# Patient Record
Sex: FEMALE | Race: WHITE | Employment: FULL TIME | URBAN - METROPOLITAN AREA
[De-identification: names, ages, dates, MRNs, and addresses within clinical notes are randomized per-mention and may not be internally consistent; named-entity substitution may affect disease eponyms.]

---

## 2017-01-23 ENCOUNTER — TRANSFERRED RECORDS (OUTPATIENT)
Dept: HEALTH INFORMATION MANAGEMENT | Facility: CLINIC | Age: 27
End: 2017-01-23

## 2017-02-14 ENCOUNTER — TRANSFERRED RECORDS (OUTPATIENT)
Dept: HEALTH INFORMATION MANAGEMENT | Facility: CLINIC | Age: 27
End: 2017-02-14

## 2017-08-03 ENCOUNTER — APPOINTMENT (OUTPATIENT)
Dept: ULTRASOUND IMAGING | Facility: CLINIC | Age: 27
End: 2017-08-03
Attending: EMERGENCY MEDICINE
Payer: COMMERCIAL

## 2017-08-03 ENCOUNTER — HOSPITAL ENCOUNTER (EMERGENCY)
Facility: CLINIC | Age: 27
Discharge: HOME | End: 2017-08-03
Attending: EMERGENCY MEDICINE | Admitting: EMERGENCY MEDICINE
Payer: COMMERCIAL

## 2017-08-03 ENCOUNTER — APPOINTMENT (OUTPATIENT)
Dept: CT IMAGING | Facility: CLINIC | Age: 27
End: 2017-08-03
Attending: EMERGENCY MEDICINE
Payer: COMMERCIAL

## 2017-08-03 VITALS
SYSTOLIC BLOOD PRESSURE: 129 MMHG | RESPIRATION RATE: 16 BRPM | BODY MASS INDEX: 36.65 KG/M2 | TEMPERATURE: 98 F | HEART RATE: 106 BPM | DIASTOLIC BLOOD PRESSURE: 84 MMHG | HEIGHT: 65 IN | WEIGHT: 220 LBS | OXYGEN SATURATION: 98 %

## 2017-08-03 DIAGNOSIS — N39.0 URINARY TRACT INFECTION, SITE UNSPECIFIED: ICD-10-CM

## 2017-08-03 DIAGNOSIS — R10.31 ABDOMINAL PAIN, RIGHT LOWER QUADRANT: ICD-10-CM

## 2017-08-03 LAB
ALBUMIN SERPL-MCNC: 4.1 G/DL (ref 3.4–5)
ALBUMIN UR-MCNC: NEGATIVE MG/DL
ALP SERPL-CCNC: 103 U/L (ref 40–150)
ALT SERPL W P-5'-P-CCNC: 22 U/L (ref 0–50)
ANION GAP SERPL CALCULATED.3IONS-SCNC: 6 MMOL/L (ref 3–14)
APPEARANCE UR: CLEAR
AST SERPL W P-5'-P-CCNC: 14 U/L (ref 0–45)
BASOPHILS # BLD AUTO: 0 10E9/L (ref 0–0.2)
BASOPHILS NFR BLD AUTO: 0.2 %
BILIRUB SERPL-MCNC: 0.3 MG/DL (ref 0.2–1.3)
BILIRUB UR QL STRIP: NEGATIVE
BUN SERPL-MCNC: 10 MG/DL (ref 7–30)
CALCIUM SERPL-MCNC: 9.2 MG/DL (ref 8.5–10.1)
CHLORIDE SERPL-SCNC: 109 MMOL/L (ref 94–109)
CO2 SERPL-SCNC: 26 MMOL/L (ref 20–32)
COLOR UR AUTO: ABNORMAL
CREAT SERPL-MCNC: 0.69 MG/DL (ref 0.52–1.04)
DIFFERENTIAL METHOD BLD: NORMAL
EOSINOPHIL # BLD AUTO: 0.1 10E9/L (ref 0–0.7)
EOSINOPHIL NFR BLD AUTO: 1.2 %
ERYTHROCYTE [DISTWIDTH] IN BLOOD BY AUTOMATED COUNT: 13.3 % (ref 10–15)
GFR SERPL CREATININE-BSD FRML MDRD: NORMAL ML/MIN/1.7M2
GLUCOSE SERPL-MCNC: 93 MG/DL (ref 70–99)
GLUCOSE UR STRIP-MCNC: NEGATIVE MG/DL
HCG SERPL QL: NEGATIVE
HCT VFR BLD AUTO: 38.9 % (ref 35–47)
HGB BLD-MCNC: 13 G/DL (ref 11.7–15.7)
HGB UR QL STRIP: ABNORMAL
IMM GRANULOCYTES # BLD: 0 10E9/L (ref 0–0.4)
IMM GRANULOCYTES NFR BLD: 0.2 %
KETONES UR STRIP-MCNC: NEGATIVE MG/DL
LEUKOCYTE ESTERASE UR QL STRIP: ABNORMAL
LIPASE SERPL-CCNC: 121 U/L (ref 73–393)
LYMPHOCYTES # BLD AUTO: 1.9 10E9/L (ref 0.8–5.3)
LYMPHOCYTES NFR BLD AUTO: 31.9 %
MCH RBC QN AUTO: 28.2 PG (ref 26.5–33)
MCHC RBC AUTO-ENTMCNC: 33.4 G/DL (ref 31.5–36.5)
MCV RBC AUTO: 84 FL (ref 78–100)
MICRO REPORT STATUS: NORMAL
MONOCYTES # BLD AUTO: 0.3 10E9/L (ref 0–1.3)
MONOCYTES NFR BLD AUTO: 5.2 %
NEUTROPHILS # BLD AUTO: 3.6 10E9/L (ref 1.6–8.3)
NEUTROPHILS NFR BLD AUTO: 61.3 %
NITRATE UR QL: NEGATIVE
NRBC # BLD AUTO: 0 10*3/UL
NRBC BLD AUTO-RTO: 0 /100
PH UR STRIP: 6 PH (ref 5–7)
PLATELET # BLD AUTO: 286 10E9/L (ref 150–450)
POTASSIUM SERPL-SCNC: 3.8 MMOL/L (ref 3.4–5.3)
PROT SERPL-MCNC: 8 G/DL (ref 6.8–8.8)
RBC # BLD AUTO: 4.61 10E12/L (ref 3.8–5.2)
RBC #/AREA URNS AUTO: 1 /HPF (ref 0–2)
SODIUM SERPL-SCNC: 141 MMOL/L (ref 133–144)
SP GR UR STRIP: 1 (ref 1–1.03)
SPECIMEN SOURCE: NORMAL
SQUAMOUS #/AREA URNS AUTO: 2 /HPF (ref 0–1)
URN SPEC COLLECT METH UR: ABNORMAL
UROBILINOGEN UR STRIP-MCNC: NORMAL MG/DL (ref 0–2)
WBC # BLD AUTO: 5.8 10E9/L (ref 4–11)
WBC #/AREA URNS AUTO: 4 /HPF (ref 0–2)
WET PREP SPEC: NORMAL

## 2017-08-03 PROCEDURE — 25000128 H RX IP 250 OP 636: Performed by: EMERGENCY MEDICINE

## 2017-08-03 PROCEDURE — 83690 ASSAY OF LIPASE: CPT | Performed by: EMERGENCY MEDICINE

## 2017-08-03 PROCEDURE — 81001 URINALYSIS AUTO W/SCOPE: CPT | Performed by: EMERGENCY MEDICINE

## 2017-08-03 PROCEDURE — 93976 VASCULAR STUDY: CPT | Mod: XS

## 2017-08-03 PROCEDURE — 87491 CHLMYD TRACH DNA AMP PROBE: CPT | Performed by: EMERGENCY MEDICINE

## 2017-08-03 PROCEDURE — 87210 SMEAR WET MOUNT SALINE/INK: CPT | Performed by: EMERGENCY MEDICINE

## 2017-08-03 PROCEDURE — 80053 COMPREHEN METABOLIC PANEL: CPT | Performed by: EMERGENCY MEDICINE

## 2017-08-03 PROCEDURE — 25000125 ZZHC RX 250: Performed by: EMERGENCY MEDICINE

## 2017-08-03 PROCEDURE — 87086 URINE CULTURE/COLONY COUNT: CPT | Performed by: EMERGENCY MEDICINE

## 2017-08-03 PROCEDURE — 74177 CT ABD & PELVIS W/CONTRAST: CPT

## 2017-08-03 PROCEDURE — 96375 TX/PRO/DX INJ NEW DRUG ADDON: CPT

## 2017-08-03 PROCEDURE — 85025 COMPLETE CBC W/AUTO DIFF WBC: CPT | Performed by: EMERGENCY MEDICINE

## 2017-08-03 PROCEDURE — 96374 THER/PROPH/DIAG INJ IV PUSH: CPT

## 2017-08-03 PROCEDURE — 84703 CHORIONIC GONADOTROPIN ASSAY: CPT | Performed by: EMERGENCY MEDICINE

## 2017-08-03 PROCEDURE — 87591 N.GONORRHOEAE DNA AMP PROB: CPT | Performed by: EMERGENCY MEDICINE

## 2017-08-03 PROCEDURE — 76705 ECHO EXAM OF ABDOMEN: CPT | Mod: XS

## 2017-08-03 PROCEDURE — 96376 TX/PRO/DX INJ SAME DRUG ADON: CPT

## 2017-08-03 PROCEDURE — 99285 EMERGENCY DEPT VISIT HI MDM: CPT | Mod: 25

## 2017-08-03 RX ORDER — NITROFURANTOIN 25; 75 MG/1; MG/1
100 CAPSULE ORAL 2 TIMES DAILY
Qty: 14 CAPSULE | Refills: 0 | Status: SHIPPED | OUTPATIENT
Start: 2017-08-03

## 2017-08-03 RX ORDER — HYDROMORPHONE HYDROCHLORIDE 1 MG/ML
0.5 INJECTION, SOLUTION INTRAMUSCULAR; INTRAVENOUS; SUBCUTANEOUS
Status: COMPLETED | OUTPATIENT
Start: 2017-08-03 | End: 2017-08-03

## 2017-08-03 RX ORDER — IOPAMIDOL 755 MG/ML
111 INJECTION, SOLUTION INTRAVASCULAR ONCE
Status: COMPLETED | OUTPATIENT
Start: 2017-08-03 | End: 2017-08-03

## 2017-08-03 RX ORDER — ONDANSETRON 2 MG/ML
4 INJECTION INTRAMUSCULAR; INTRAVENOUS EVERY 30 MIN PRN
Status: DISCONTINUED | OUTPATIENT
Start: 2017-08-03 | End: 2017-08-03 | Stop reason: HOSPADM

## 2017-08-03 RX ADMIN — HYDROMORPHONE HYDROCHLORIDE 0.5 MG: 1 INJECTION, SOLUTION INTRAMUSCULAR; INTRAVENOUS; SUBCUTANEOUS at 14:35

## 2017-08-03 RX ADMIN — IOPAMIDOL 111 ML: 755 INJECTION, SOLUTION INTRAVENOUS at 14:45

## 2017-08-03 RX ADMIN — ONDANSETRON 4 MG: 2 SOLUTION INTRAMUSCULAR; INTRAVENOUS at 14:09

## 2017-08-03 RX ADMIN — HYDROMORPHONE HYDROCHLORIDE 0.5 MG: 1 INJECTION, SOLUTION INTRAMUSCULAR; INTRAVENOUS; SUBCUTANEOUS at 12:28

## 2017-08-03 RX ADMIN — SODIUM CHLORIDE 74 ML: 9 INJECTION, SOLUTION INTRAVENOUS at 14:46

## 2017-08-03 RX ADMIN — SODIUM CHLORIDE 1000 ML: 9 INJECTION, SOLUTION INTRAVENOUS at 12:29

## 2017-08-03 RX ADMIN — ONDANSETRON 4 MG: 2 SOLUTION INTRAMUSCULAR; INTRAVENOUS at 12:26

## 2017-08-03 ASSESSMENT — ENCOUNTER SYMPTOMS
FEVER: 0
CONSTIPATION: 0
DYSURIA: 0
DIARRHEA: 0
NAUSEA: 1
HEMATURIA: 0
BACK PAIN: 1
ABDOMINAL PAIN: 1
VOMITING: 1
CHILLS: 0

## 2017-08-03 NOTE — PROGRESS NOTES
In brief, 19 year female with right upper and right lower quadrant pain.  She has received several pain medications and several workups at outside hospitals.  She had ciprofloxacin for UTI.  She has not had any other fevers or acute changes.  Ultrasound of the right upper quadrant, pelvis and labs are obtained and unremarkable.  Patient signed out need CT with IV contrast.  It was negative.  Patient was discharged home.  She is discharged with a different antibiotics for urinary infection.  She agrees with this plan.  Also given follow-up for GI.

## 2017-08-03 NOTE — ED PROVIDER NOTES
History     Chief Complaint:  Abdominal Pain    HPI   Fanny Sultana is a 27 year old female who presents to the ED for evaluation of abdominal pain. The patient reports she has had abdominal pain for about a week. The patient had a clinic visit at [facility] on 7/28/2017 where she was prescribed Ciprofloxacin for UTI. The patient also had imaging performed on 7/31/2017 without any outstanding findings. The patient notes the medication is not helping, and the pain has been constant, prompting the patient to visit the ED. The patient notes the abdominal pain is localized on the lower right quadrant. The pain as well radiates to her back. The patient has taken Ibuprofen every 6 hours with Tylenol in between without alleviation of pain. The patient also reports symptoms of nausea and vomiting. The patient denies any fever chills, constipation, diarrhea, dysuria, hematuria, or vaginal discharge.    CT ABDOMEN PELVIS STONE PROTOCOL W/O CONTRAST, 07/31/2017  IMPRESSION:    No urinary tract stones, hydronephrosis, or other cause for flank pain.     Normal appendix.     Allergies:  No known drug allergies    Medications:    DULOXETINE HCL PO   ALPRAZOLAM PO   naproxen (NAPROSYN) 375 MG tablet   meloxicam (MOBIC) 15 MG tablet   sucralfate (CARAFATE) 1 GM tablet   omeprazole (PRILOSEC) 40 MG capsule   tiZANidine (ZANAFLEX) 4 MG capsule   MULTIPLE VITAMIN PO   rizatriptan (MAXALT) 10 MG tablet   aspirin-acetaminophen-caffeine (EXCEDRIN MIGRAINE) 250-250-65 MG per tablet   norelgestromin-ethinyl estradiol (ORTHO EVRA) 150-20 MCG/24HR patch     Past Medical History:    CRP  Anxiety  Dermoid  Migraines    Past Surgical History:    Ipsy left ovarian cystectomy  Bilateral subtenon's/periocular steroid of eyes x4    Family History:    Osteoporosis  Hypertension    Social History:  Smoking status: Never smoker  Alcohol use: Rare  Presents to ED alone  Marital Status:  Single [1]     Review of Systems   Constitutional: Negative  "for chills and fever.   Gastrointestinal: Positive for abdominal pain, nausea and vomiting. Negative for constipation and diarrhea.   Genitourinary: Negative for dysuria, hematuria and vaginal discharge.   Musculoskeletal: Positive for back pain.   All other systems reviewed and are negative.    Physical Exam     Patient Vitals for the past 24 hrs:   BP Temp Temp src Pulse Resp SpO2 Height Weight   08/03/17 1144 138/82 98  F (36.7  C) Oral 106 16 98 % 1.651 m (5' 5\") 99.8 kg (220 lb)       Physical Exam  General: Patient is alert and uncomfortable appearing.  HEENT: Head atraumatic    Eyes: pupils equal and reactive. Conjunctiva clear   Nares: patent   Oropharynx: no lesions, uvula midline, no palatal draping, normal voice, no trismus  Neck: Supple without lymphadenopathy, no meningismus  Chest: Heart regular rate and rhythm.   Lungs: Equal clear to auscultation with no wheeze or rales  Abdomen: Soft, significant RUQ and RLQ tenderness to palpation, voluntary guarding in RLQ, + murphys sign, nondistended, normal bowel sounds  Back: No costovertebral angle tenderness, no midline C, T or L spine tenderness  Neuro: Grossly nonfocal, normal speech, strength equal bilaterally, CN 2-12 intact  Extremities: No deformities, equal radial and DP pulses. No clubbing, cyanosis.  No edema  Skin: Warm and dry with no rash.   Pelvic exam: right adenexal tenderness to palpation, no discharge, no CMT      Emergency Department Course     Imaging:  Radiographic findings were communicated with the patient who voiced understanding of the findings.    US Abdomen Limited  IMPRESSION:  Normal right upper quadrant ultrasound. No gallstones or  bile duct dilatation. As read by Radiology.    US Pelvic Complete w Transvaginal & Abd/Pel Duplex Limited  IMPRESSION: Normal exam. As read by Radiology.    CT Abdomen Pelvis w Contrast  IMPRESSION: Unremarkable CT of the abdomen and pelvis. No cause for  abdominal pain demonstrated. As read by " Radiology.    Laboratory:  UA: Specific gravity 1.002(L), Blood trace, Leukocyte esterase large, WBC 4(H), Squamous epithelial 2(H)  HCG: Negative  Lipase: 121  Wet prep: Negative  CBC: o/w WNL (WBC 5.8, HGB 13.0, )   CMP: o/w WNL (Creatinine 0.69)    Urine culture aerobic bacterial: In process  Neisseria gonorrhoeae PCR: In process  Chlamydia trachomatis PCR: In process    Interventions:  1226: Zofran 4mg IV  1228: Dilaudid 0.5mg IV  1229: NS 1L Bolus IV  1409: Zofran 4mg IV  1435:  Dilaudid 0.5mg IV     Emergency Department Course:  Past medical records, nursing notes, and vitals reviewed.  1152: I performed an exam of the patient and obtained history, as documented above.  IV inserted and blood drawn.    The patient was sent for an abdomen/pelvis CT and abdomen/pelvis ultra-sound while in the emergency department, findings above.    1228: I rechecked the patient. Explained findings to patient.    1401: I rechecked the patient. Findings and plan explained to the Patient. Patient discharged home with instructions regarding supportive care, medications, and reasons to return. The importance of close follow-up was reviewed.     Impression & Plan      Medical Decision Making:  Fanny Sultana is a 27 year old female who presents with right lower quadrant pain, persistent over the last week. Had work-up at two other ERs. Negative CT, non-contrast, for stones, but was placed on Cipro for UTI. She has continued pain, nausea, and vomiting, but no fevers. Here, her initial pain was mostly in the right upper quadrant. Ultrasound is negative. Her CBC, CMP, and lipase are all within normal limits. Urine analysis continues to have large leukocyte esterase and 4 white blood cells per high power field. It is possible that there is a pyelonephritis component to this. Her kidney looked normal, and there is no sign of hydronephrosis, making stone unlikely. Pelvic exam did show some tenderness over the right adenexa. She went  for ultrasound of the pelvis and showed normal right adenexa with good normal blood flow. I do not believe this is consistent with acute ovarian torsion. Wet prep is negative. CBC is normal. I discussed with patient changing her antibiotics to see if that improves things. She is just complaining of continued pain. She does have a strong narcotic use history and gets 60 Percocet every two weeks from her primary care provider, and has had a couple of Norco prescriptions in the last week as well. There may be a component drug seeking behavior. She has had two small doses of Dilaudid here for pain.     She will go for repeat CT this time with IV contrast to ensure that there is no developing appendicitis, abscess, colitis, or diverticulitis or other surgical emergency. If the CT of her abdomen pelvis is negative, the plan is for her to go home. I am going to change her to Macrobid for UTI and possible resistance to cipro, and have her follow-up with GI. This may very well be Crohn's disease or other inflammatory bowel disease. Patient understands that if the CT is negative, we have effectively ruled out acute surgical emergencies in her abdomen, and she will need further work-up and testing as an out-patient. Patient will be signed out to my partner to follow-up with the CT results. If abnormal, make changes to disposition; otherwise, plan will be for the patient to be discharged with follow-up with GI. Macrobid for UTI and primary care provider follow-up as well. Patient expressed agreement and understanding of this plan     Diagnosis:    ICD-10-CM   1. Abdominal pain, right lower quadrant R10.31   2. Urinary tract infection, site unspecified N39.0     Disposition: Patient discharged to home    Discharge Medications:  New Prescriptions    NITROFURANTOIN, MACROCRYSTAL-MONOHYDRATE, (MACROBID) 100 MG CAPSULE    Take 1 capsule (100 mg) by mouth 2 times daily     Shannan Howe  8/3/2017    EMERGENCY DEPARTMENT    I,  Shannan Howe, am serving as a scribe at 11:52 AM on 8/3/2017 to document services personally performed by Pao Cueva MD based on my observations and the provider's statements to me.          Pao Cueva MD  08/03/17 2008

## 2017-08-03 NOTE — ED AVS SNAPSHOT
Emergency Department    [address] [place] [id number]    Phone:  [phone]    Fax:  [phone]                                       Fanny Sultana   MRN: [id number]    Department:   Emergency Department   Date of Visit:  8/3/2017           After Visit Summary Signature Page     I have received my discharge instructions, and my questions have been answered. I have discussed any challenges I see with this plan with the nurse or doctor.    ..........................................................................................................................................  Patient/Patient Representative Signature      ..........................................................................................................................................  Patient Representative Print Name and Relationship to Patient    ..................................................               ................................................  Date                                            Time    ..........................................................................................................................................  Reviewed by Signature/Title    ...................................................              ..............................................  Date                                                            Time

## 2017-08-03 NOTE — DISCHARGE INSTRUCTIONS
*Abdominal Pain, Unknown Cause (Female)    The exact cause of your abdominal (stomach) pain is not certain. This does not mean that this is something to worry about, or the right tests were not done. Everyone likes to know the exact cause of the problem, but sometimes with abdominal pain, there is no clear-cut cause, and this could be a good thing. The good news is that your symptoms can be treated, and you will feel better.   Your condition does not seem serious now; however, sometimes the signs of a serious problem may take more time to appear. For this reason, it is important for you to watch for any new symptoms, problems, or worsening of your condition.  Over the next few days, the abdominal pain may come and go, or be continuous. Other common symptoms can include nausea and vomiting. Sometimes it can be difficult to tell if you feel nauseous, you may just feel bad and not associate that feeling with nausea. Constipation, diarrhea, and a fever may go along with the pain.  The pain may continue even if treated correctly over the following days. Depending on how things go, sometimes the cause can become clear and may require further or different treatment. Additional evaluations, medications, or tests may be needed.  Home care  Your health care provider may prescribe medications for pain, symptoms, or an infection.  Follow the health care provider's instructions for taking these medications.  General care    Rest until your next exam. No strenuous activities.    Try to find positions that ease discomfort. A small pillow placed on the abdomen may help relieve pain.    Something warm on your abdomen (such as a heating pad) may help, but be careful not to burn yourself.  Diet    Do not force yourself to eat, especially if having cramps, vomiting, or diarrhea.    Water is important so you do not get dehydrated. Soup may also be good. Sports drinks may also help, especially if they are not too acidic. Make sure you  don't drink sugary drinks as this can make things worse. Take liquids in small amounts. Do not guzzle them.    Caffeine sometimes makes the pain and cramping worse.    Avoid dairy products if you have vomiting or diarrhea.    Don't eat large amounts at a time. Wait a few minutes between bites.    Eat a diet low in fiber (called a low-residue diet). Foods allowed include refined breads, white rice, fruit and vegetable juices without pulp, tender meats. These foods will pass more easily through the intestine.    Avoid fried or fatty foods, dairy, alcohol and spicy foods until your symptoms go away.  Follow-up care  Follow up with your health care provider as instructed, or if your pain does not begin to improve in the next 24 hours.  When to seek medical care  Seek prompt medical care if any of the following occur:    Pain gets worse or moves to the right lower abdomen    New or worsening vomiting or diarrhea    Swelling of the abdomen    Unable to pass stool for more than three days    New fever over 101  F (38.3 C), or rising fever    Blood in vomit or bowel movements (dark red or black color)    Jaundice (yellow color of eyes and skin)    Weakness, dizziness    Chest, arm, back, neck or jaw pain    Unexpected vaginal bleeding or missed period  Call 911  Call emergency services if any of the following occur:    Trouble breathing    Confusion    Fainting or loss of consciousness    Rapid heart rate    Seizure    [id number] [facility], [address] [place] [address], [place] [address]. All rights reserved. This information is not intended as a substitute for professional medical care. Always follow your healthcare professional's instructions.

## 2017-08-03 NOTE — ED AVS SNAPSHOT
Emergency Department    [address] [facility] [address]    Phone:  [phone]    Fax:  [phone]                                       Fanny Sultana   MRN: [id number]    Department:   Emergency Department   Date of Visit:  8/3/2017           Patient Information     Date Of Birth          1990        Your diagnoses for this visit were:     Abdominal pain, right lower quadrant     Urinary tract infection, site unspecified        You were seen by Pao Cueva MD and Mayelin Castaneda MD.      Follow-up Information     Follow up with [facility]    Contact information:    [address] [facility] [address]          Schedule an appointment as soon as possible for a visit with [facility].    Contact information:    [address] [address]  [facility] [phone]        Follow up with  Emergency Department.    Specialty:  EMERGENCY MEDICINE    Why:  If symptoms worsen    Contact information:    [address] [facility] [address] [phone]        Discharge Instructions         *Abdominal Pain, Unknown Cause (Female)    The exact cause of your abdominal (stomach) pain is not certain. This does not mean that this is something to worry about, or the right tests were not done. Everyone likes to know the exact cause of the problem, but sometimes with abdominal pain, there is no clear-cut cause, and this could be a good thing. The good news is that your symptoms can be treated, and you will feel better.   Your condition does not seem serious now; however, sometimes the signs of a serious problem may take more time to appear. For this reason, it is important for you to watch for any new symptoms, problems, or worsening of your condition.  Over the next few days, the abdominal pain may come and go, or be continuous. Other common symptoms can include nausea and vomiting. Sometimes it can be difficult to tell if you  feel nauseous, you may just feel bad and not associate that feeling with nausea. Constipation, diarrhea, and a fever may go along with the pain.  The pain may continue even if treated correctly over the following days. Depending on how things go, sometimes the cause can become clear and may require further or different treatment. Additional evaluations, medications, or tests may be needed.  Home care  Your health care provider may prescribe medications for pain, symptoms, or an infection.  Follow the health care provider's instructions for taking these medications.  General care    Rest until your next exam. No strenuous activities.    Try to find positions that ease discomfort. A small pillow placed on the abdomen may help relieve pain.    Something warm on your abdomen (such as a heating pad) may help, but be careful not to burn yourself.  Diet    Do not force yourself to eat, especially if having cramps, vomiting, or diarrhea.    Water is important so you do not get dehydrated. Soup may also be good. Sports drinks may also help, especially if they are not too acidic. Make sure you don't drink sugary drinks as this can make things worse. Take liquids in small amounts. Do not guzzle them.    Caffeine sometimes makes the pain and cramping worse.    Avoid dairy products if you have vomiting or diarrhea.    Don't eat large amounts at a time. Wait a few minutes between bites.    Eat a diet low in fiber (called a low-residue diet). Foods allowed include refined breads, white rice, fruit and vegetable juices without pulp, tender meats. These foods will pass more easily through the intestine.    Avoid fried or fatty foods, dairy, alcohol and spicy foods until your symptoms go away.  Follow-up care  Follow up with your health care provider as instructed, or if your pain does not begin to improve in the next 24 hours.  When to seek medical care  Seek prompt medical care if any of the following occur:    Pain gets worse or  moves to the right lower abdomen    New or worsening vomiting or diarrhea    Swelling of the abdomen    Unable to pass stool for more than three days    New fever over 101  F (38.3 C), or rising fever    Blood in vomit or bowel movements (dark red or black color)    Jaundice (yellow color of eyes and skin)    Weakness, dizziness    Chest, arm, back, neck or jaw pain    Unexpected vaginal bleeding or missed period  Call 911  Call emergency services if any of the following occur:    Trouble breathing    Confusion    Fainting or loss of consciousness    Rapid heart rate    Seizure    [id number] [employer] [facility], [address] [place] [address], [place] [address]. All rights reserved. This information is not intended as a substitute for professional medical care. Always follow your healthcare professional's instructions.          24 Hour Appointment Hotline       To make an appointment at any [facility], call [phone] ([phone]). If you don't have a family doctor or clinic, we will help you find one. [facility] clinics are conveniently located to serve the needs of you and your family.             Review of your medicines      START taking        Dose / Directions Last dose taken    nitroFURantoin (macrocrystal-monohydrate) 100 MG capsule   Commonly known as:  MACROBID   Dose:  100 mg   Quantity:  14 capsule        Take 1 capsule (100 mg) by mouth 2 times daily   Refills:  0          Our records show that you are taking the medicines listed below. If these are incorrect, please call your family doctor or clinic.        Dose / Directions Last dose taken    ALPRAZOLAM PO        Refills:  0        CIPROFLOXACIN PO        Refills:  0        DULOXETINE HCL PO        Refills:  0        EXCEDRIN MIGRAINE 250-250-65 MG per tablet   Dose:  1 tablet   Generic drug:  aspirin-acetaminophen-caffeine        Take 1 tablet by mouth every 6 hours as needed.   Refills:  0        MAXALT 10 MG tablet   Dose:  10 mg   Generic drug:   rizatriptan        Take 10 mg by mouth at onset of headache.   Refills:  0        meloxicam 15 MG tablet   Commonly known as:  MOBIC   Dose:  15 mg   Quantity:  90 tablet        Take 1 tablet (15 mg) by mouth daily   Refills:  1        MULTIPLE VITAMIN PO   Dose:  1 tablet        Take 1 tablet by mouth daily.   Refills:  0        naproxen 375 MG tablet   Commonly known as:  NAPROSYN   Dose:  375 mg   Quantity:  60 tablet        Take 1 tablet (375 mg) by mouth 2 times daily (with meals)   Refills:  11        omeprazole 40 MG capsule   Commonly known as:  priLOSEC   Dose:  40 mg   Quantity:  30 capsule        Take 1 capsule (40 mg) by mouth daily Take 30-60 minutes before a meal.   Refills:  1        ORTHO EVRA 150-35 MCG/24HR patch   Dose:  1 patch   Generic drug:  norelgestromin-ethinyl estradiol        Place 1 patch onto the skin once a week.   Refills:  0        sucralfate 1 GM tablet   Commonly known as:  CARAFATE   Dose:  1 g   Quantity:  120 tablet        Take 1 tablet (1 g) by mouth 4 times daily   Refills:  5        tiZANidine 4 MG capsule   Commonly known as:  ZANAFLEX        Half tablet -one tablet daily as needed   Refills:  0                Prescriptions were sent or printed at these locations (1 Prescription)                   Other Prescriptions                Printed at Department/Unit printer (1 of 1)         nitroFURantoin, macrocrystal-monohydrate, (MACROBID) 100 MG capsule                Procedures and tests performed during your visit     CBC with platelets differential    CT Abdomen Pelvis w Contrast    Chlamydia trachomatis PCR    Comprehensive metabolic panel    HCG qualitative    Lipase    Neisseria gonorrhoeae PCR    UA reflex to Microscopic and Culture    US Abdomen Limited    US Pelvic Complete w Transvaginal & Abd/Pel Duplex Limited    Urine Culture Aerobic Bacterial    Wet prep      Orders Needing Specimen Collection     None      Pending Results     Date and Time Order Name Status  Description    8/3/2017 1232 Chlamydia trachomatis PCR In process     8/3/2017 1232 Neisseria gonorrhoeae PCR In process     8/3/2017 1201 Urine Culture Aerobic Bacterial In process             Pending Culture Results     Date and Time Order Name Status Description    8/3/2017 1232 Chlamydia trachomatis PCR In process     8/3/2017 1232 Neisseria gonorrhoeae PCR In process     8/3/2017 1201 Urine Culture Aerobic Bacterial In process             Pending Results Instructions     If you had any lab results that were not finalized at the time of your Discharge, you can call the ED Lab Result RN at [phone]. You will be contacted by this team for any positive Lab results or changes in treatment. The nurses are available 7 days a week from 10A to 6:30P.  You can leave a message 24 hours per day and they will return your call.        Test Results From Your Hospital Stay        8/3/2017 12:22 PM      Component Results     Component Value Ref Range & Units Status    WBC 5.8 4.0 - 11.0 10e9/L Final    RBC Count 4.61 3.8 - 5.2 10e12/L Final    Hemoglobin 13.0 11.7 - 15.7 g/dL Final    Hematocrit 38.9 35.0 - 47.0 % Final    MCV 84 78 - 100 fl Final    MCH 28.2 26.5 - 33.0 pg Final    MCHC 33.4 31.5 - 36.5 g/dL Final    RDW 13.3 10.0 - 15.0 % Final    Platelet Count 286 150 - 450 10e9/L Final    Diff Method Automated Method  Final    % Neutrophils 61.3 % Final    % Lymphocytes 31.9 % Final    % Monocytes 5.2 % Final    % Eosinophils 1.2 % Final    % Basophils 0.2 % Final    % Immature Granulocytes 0.2 % Final    Nucleated RBCs 0 0 /100 Final    Absolute Neutrophil 3.6 1.6 - 8.3 10e9/L Final    Absolute Lymphocytes 1.9 0.8 - 5.3 10e9/L Final    Absolute Monocytes 0.3 0.0 - 1.3 10e9/L Final    Absolute Eosinophils 0.1 0.0 - 0.7 10e9/L Final    Absolute Basophils 0.0 0.0 - 0.2 10e9/L Final    Abs Immature Granulocytes 0.0 0 - 0.4 10e9/L Final    Absolute Nucleated RBC 0.0  Final         8/3/2017 12:39 PM      Component Results      Component Value Ref Range & Units Status    Sodium 141 133 - 144 mmol/L Final    Potassium 3.8 3.4 - 5.3 mmol/L Final    Chloride 109 94 - 109 mmol/L Final    Carbon Dioxide 26 20 - 32 mmol/L Final    Anion Gap 6 3 - 14 mmol/L Final    Glucose 93 70 - 99 mg/dL Final    Urea Nitrogen 10 7 - 30 mg/dL Final    Creatinine 0.69 0.52 - 1.04 mg/dL Final    GFR Estimate >90  Non  GFR Calc   >60 mL/min/1.7m2 Final    GFR Estimate If Black >90   GFR Calc   >60 mL/min/1.7m2 Final    Calcium 9.2 8.5 - 10.1 mg/dL Final    Bilirubin Total 0.3 0.2 - 1.3 mg/dL Final    Albumin 4.1 3.4 - 5.0 g/dL Final    Protein Total 8.0 6.8 - 8.8 g/dL Final    Alkaline Phosphatase 103 40 - 150 U/L Final    ALT 22 0 - 50 U/L Final    AST 14 0 - 45 U/L Final         8/3/2017 12:36 PM      Component Results     Component Value Ref Range & Units Status    Lipase 121 73 - 393 U/L Final         8/3/2017 12:38 PM      Component Results     Component Value Ref Range & Units Status    HCG Qualitative Serum Negative NEG Final         8/3/2017  1:10 PM      Narrative     ULTRASOUND ABDOMEN LIMITED  8/3/2017 12:53 PM     HISTORY: Right upper quadrant pain.    FINDINGS:  Liver is normal in echogenicity without focal lesions. The  gallbladder is normal without stones or sludge. Common bile duct is  normal in diameter. Pancreas is normal where visualized. Examination  of the right kidney is unremarkable.        Impression     IMPRESSION:  Normal right upper quadrant ultrasound. No gallstones or  bile duct dilatation.    PALOMO VARGAS MD         8/3/2017 12:43 PM      Component Results     Component    Specimen Description    Cervix    Wet Prep    No Trichomonas seen  No yeast seen  No clue cells seen  Few PMNs seen      Micro Report Status    FINAL 08/03/2017         8/3/2017 12:36 PM         8/3/2017 12:36 PM         8/3/2017  3:27 PM      Narrative     ULTRASOUND PELVIS COMPLETE WITH TRANSVAGINAL AND DOPPLER LIMITED   8/3/2017  2:15 PM    HISTORY: 27-year-old woman with right lower quadrant pain for the  previous week.    COMPARISON: None.    TECHNIQUE: Transabdominal ultrasound images obtained throughout the  pelvis. Due to inadequate bladder fullness, transvaginal ultrasound  imaging required. Grayscale and color Doppler imaging obtained with  color Doppler imaging of both ovaries.    FINDINGS: The uterus is 6.3 x 3.2 x 2.5 cm. Endometrial thickness is  0.4 cm.    The right ovary is 2.5 x 2 x 2.2 cm and the left ovary is 2.3 x 1.5 x  1.5 cm.  Arterial and venous blood flow noted in both ovaries. No  pelvic free fluid. Follicles visible in both ovaries.        Impression     IMPRESSION: Normal exam.    DARIEL BYRNES MD         8/3/2017  2:13 PM      Component Results     Component Value Ref Range & Units Status    Color Urine Straw  Final    Appearance Urine Clear  Final    Glucose Urine Negative NEG mg/dL Final    Bilirubin Urine Negative NEG Final    Ketones Urine Negative NEG mg/dL Final    Specific Gravity Urine 1.002 (L) 1.003 - 1.035 Final    Blood Urine Trace (A) NEG Final    pH Urine 6.0 5.0 - 7.0 pH Final    Protein Albumin Urine Negative NEG mg/dL Final    Urobilinogen mg/dL Normal 0.0 - 2.0 mg/dL Final    Nitrite Urine Negative NEG Final    Leukocyte Esterase Urine Large (A) NEG Final    Source Midstream Urine  Final    RBC Urine 1 0 - 2 /HPF Final    WBC Urine 4 (H) 0 - 2 /HPF Final    Squamous Epithelial /HPF Urine 2 (H) 0 - 1 /HPF Final         8/3/2017  2:15 PM         8/3/2017  3:09 PM      Narrative     CT ABDOMEN AND PELVIS WITH CONTRAST   8/3/2017 2:47 PM     HISTORY: Right lower quadrant abdominal pain.    TECHNIQUE:  111 mL Isovue-370. CT images of the abdomen and pelvis  following nonionic intravenous contrast. Radiation dose for this scan  was reduced using automated exposure control, adjustment of the mA  and/or kV according to patient size, or iterative reconstruction  technique.    COMPARISON:  None.    FINDINGS: The liver, gallbladder, spleen, pancreas, adrenal glands,  and kidneys appear normal. A splenule is noted anterior to the spleen.  No free air, bowel obstruction, or free fluid. No CT evidence of  appendicitis. No abdominal or retroperitoneal adenopathy. No pelvic  mass, lymphadenopathy, or free fluid.        Impression     IMPRESSION: Unremarkable CT of the abdomen and pelvis. No cause for  abdominal pain demonstrated.    TIARA DUGGAN MD                Clinical Quality Measure: Blood Pressure Screening     Your blood pressure was checked while you were in the emergency department today. The last reading we obtained was  BP: 112/67 . Please read the guidelines below about what these numbers mean and what you should do about them.  If your systolic blood pressure (the top number) is less than 120 and your diastolic blood pressure (the bottom number) is less than 80, then your blood pressure is normal. There is nothing more that you need to do about it.  If your systolic blood pressure (the top number) is 120-139 or your diastolic blood pressure (the bottom number) is 80-89, your blood pressure may be higher than it should be. You should have your blood pressure rechecked within a year by a primary care provider.  If your systolic blood pressure (the top number) is 140 or greater or your diastolic blood pressure (the bottom number) is 90 or greater, you may have high blood pressure. High blood pressure is treatable, but if left untreated over time it can put you at risk for heart attack, stroke, or kidney failure. You should have your blood pressure rechecked by a primary care provider within the next 4 weeks.  If your provider in the emergency department today gave you specific instructions to follow-up with your doctor or provider even sooner than that, you should follow that instruction and not wait for up to 4 weeks for your follow-up visit.        Thank you for choosing [facility]       Thank  you for choosing [facility] for your care. Our goal is always to provide you with excellent care. Hearing back from our patients is one way we can continue to improve our services. Please take a few minutes to complete the written survey that you may receive in the mail after you visit with us. Thank you!        [employer] Information     [employer] gives you secure access to your electronic health record. If you see a primary care provider, you can also send messages to your care team and make appointments. If you have questions, please call your primary care clinic.  If you do not have a primary care provider, please call [phone] and they will assist you.        Care EveryWhere ID     This is your Care EveryWhere ID. This could be used by other organizations to access your [facility] medical records  [id number]        Equal Access to Services     DAVID HERNANDEZ : Yolanda Liz, love berkowitz, james burrell, shant malone. So [facility] [phone].    ATENCIÓN: Si habla español, tiene a orona disposición servicios gratuitos de asistencia lingüística. Llame al [phone].    We comply with applicable federal civil rights laws and [place] laws. We do not discriminate on the basis of race, color, national origin, age, disability sex, sexual orientation or gender identity.            After Visit Summary       This is your record. Keep this with you and show to your community pharmacist(s) and doctor(s) at your next visit.

## 2017-08-04 LAB
BACTERIA SPEC CULT: NO GROWTH
C TRACH DNA SPEC QL NAA+PROBE: NORMAL
Lab: NORMAL
MICRO REPORT STATUS: NORMAL
N GONORRHOEA DNA SPEC QL NAA+PROBE: NORMAL
SPECIMEN SOURCE: NORMAL

## 2017-08-10 ENCOUNTER — TRANSFERRED RECORDS (OUTPATIENT)
Dept: HEALTH INFORMATION MANAGEMENT | Facility: CLINIC | Age: 27
End: 2017-08-10

## 2017-09-04 ENCOUNTER — HOSPITAL ENCOUNTER (EMERGENCY)
Facility: CLINIC | Age: 27
Discharge: HOME | End: 2017-09-05
Attending: EMERGENCY MEDICINE | Admitting: EMERGENCY MEDICINE
Payer: COMMERCIAL

## 2017-09-04 DIAGNOSIS — H54.61 VISION LOSS OF RIGHT EYE: ICD-10-CM

## 2017-09-04 DIAGNOSIS — H53.9 VISUAL DISTURBANCE: ICD-10-CM

## 2017-09-04 PROCEDURE — 25000132 ZZH RX MED GY IP 250 OP 250 PS 637: Performed by: EMERGENCY MEDICINE

## 2017-09-04 PROCEDURE — 99285 EMERGENCY DEPT VISIT HI MDM: CPT | Mod: 25

## 2017-09-04 PROCEDURE — 99207 ZZC APP CREDIT; MD BILLING SHARED VISIT: CPT | Mod: Z6 | Performed by: EMERGENCY MEDICINE

## 2017-09-04 PROCEDURE — 99285 EMERGENCY DEPT VISIT HI MDM: CPT | Mod: Z6 | Performed by: EMERGENCY MEDICINE

## 2017-09-04 PROCEDURE — 25000125 ZZHC RX 250: Performed by: EMERGENCY MEDICINE

## 2017-09-04 RX ORDER — ONDANSETRON 4 MG/1
4 TABLET, ORALLY DISINTEGRATING ORAL ONCE
Status: COMPLETED | OUTPATIENT
Start: 2017-09-04 | End: 2017-09-04

## 2017-09-04 RX ORDER — PROPARACAINE HYDROCHLORIDE 5 MG/ML
2 SOLUTION/ DROPS OPHTHALMIC ONCE
Status: DISCONTINUED | OUTPATIENT
Start: 2017-09-04 | End: 2017-09-05 | Stop reason: HOSPADM

## 2017-09-04 RX ORDER — LORAZEPAM 0.5 MG/1
1 TABLET ORAL ONCE
Status: COMPLETED | OUTPATIENT
Start: 2017-09-04 | End: 2017-09-04

## 2017-09-04 RX ADMIN — ONDANSETRON 4 MG: 4 TABLET, ORALLY DISINTEGRATING ORAL at 21:37

## 2017-09-04 RX ADMIN — LORAZEPAM 1 MG: 0.5 TABLET ORAL at 23:58

## 2017-09-04 ASSESSMENT — ENCOUNTER SYMPTOMS
EYE REDNESS: 0
HEADACHES: 0
VOMITING: 0
NECK STIFFNESS: 0
NAUSEA: 0
EYE DISCHARGE: 0
PHOTOPHOBIA: 0
BRUISES/BLEEDS EASILY: 0
NECK PAIN: 0
ADENOPATHY: 0
EYE PAIN: 1
POLYDIPSIA: 0
EYE ITCHING: 0

## 2017-09-04 ASSESSMENT — VISUAL ACUITY
OD: LIGHT PERCEPTION
OS: 20/25

## 2017-09-04 NOTE — ED AVS SNAPSHOT
[facility], [place], Emergency Department    [address] [place] [address]    Phone:  [phone]                                       Fanny Sultana   MRN: [id number]    Department:  [facility], Emergency Department   Date of Visit:  9/4/2017           After Visit Summary Signature Page     I have received my discharge instructions, and my questions have been answered. I have discussed any challenges I see with this plan with the nurse or doctor.    ..........................................................................................................................................  Patient/Patient Representative Signature      ..........................................................................................................................................  Patient Representative Print Name and Relationship to Patient    ..................................................               ................................................  Date                                            Time    ..........................................................................................................................................  Reviewed by Signature/Title    ...................................................              ..............................................  Date                                                            Time

## 2017-09-04 NOTE — ED AVS SNAPSHOT
[facility], Emergency Department    [address] [address]    Phone:  [phone]                                       Fanny Sultana   MRN: [id number]    Department:  [facility], Emergency Department   Date of Visit:  9/4/2017           Patient Information     Date Of Birth          1990        Your diagnoses for this visit were:     Visual disturbance     Vision loss of right eye        You were seen by Anthony Zelaya MD and Debbie Canseco MD.        Discharge Instructions       You will receive a call from the Eye Clinic (phone: [phone]) this morning with further recommendations. Return with any new or concerning symptoms.    Understanding Vision Problems     Normal     If your eyes are normal  Your eyes see objects as light. Your cornea focuses light rays onto a layer that lines the back of your eye (the retina). If your eyes are normal, this process produces a focused image on your retina. This makes objects look clear.     Nearsighted     If you re nearsighted  Your cornea and your retina are too far apart if you re nearsighted. Sometimes your cornea or your lens has an abnormal shape. This makes light rays from distant objects focus in front of your retina. These objects then look blurry.     Farsighted     If you re farsighted  Your cornea and your retina are too close together if you re farsighted. Sometimes your cornea or your lens has an abnormal shape. This makes light rays from close objects focus behind your retina. These objects then look blurry.     Astigmatism     If you have an astigmatism  Sometimes the curve of your cornea is uneven or the lens inside your eye is curved. Then light rays can t focus evenly onto your retina. This is called an astigmatism. It makes both close and distant objects look blurry.  Date Last Reviewed: 6/11/2015 2000-2017 [employer]. [address] [place] [address], [place] [address]. Todos los derechos reservados. Esta  información no pretende sustituir la atención médica profesional. Sólo orona médico puede diagnosticar y tratar un problema de magalis.          How the Eye Works    Sharp vision depends on many factors. The parts of the eye work together to refract, or bend, and focus light rays. For normal vision, light must focus onto the retina.   Cornea  Light enters the eye through this clear, dome-shaped tissue. The cornea also bends light rays to help focus them. Problems with the cornea's shape can affect vision.  Pupil  This circular window in the center of the iris opens and closes to let the right amount of light into the eye.  Iris  This is the colored part of the eye. It contains muscles that dilate or open, and constrict or close, the pupil.  Lens  This disc of clear tissue behind the pupil changes shape, or accommodates, to help focus light.  Retina  This thin layer of light-sensitive tissue lines the inside of the eye. The retina sends signals to the optic nerve.  Optic nerve  This nerve carries signals from the retina to the brain. The brain then interprets these signals to make images. These images are what you see.  Date Last Reviewed: 9/9/2015 2000-2017 [employer]. [address] [place] [address]. All rights reserved. This information is not intended as a substitute for professional medical care. Always follow your healthcare professional's instructions.            24 Hour Appointment Hotline       To make an appointment at any [facility], call [phone] ([phone]). If you don't have a family doctor or clinic, we will help you find one. [facility] clinics are conveniently located to serve the needs of you and your family.             Review of your medicines      START taking        Dose / Directions Last dose taken    oxyCODONE 5 MG IR tablet   Commonly known as:  ROXICODONE   Dose:  5-10 mg   Quantity:  15 tablet        Take 1-2 tablets (5-10 mg) by mouth every 6 hours as  needed for pain   Refills:  0          Our records show that you are taking the medicines listed below. If these are incorrect, please call your family doctor or clinic.        Dose / Directions Last dose taken    ACYCLOVIR PO   Dose:  400 mg   Indication:  Shingles        Take 400 mg by mouth 5 times daily   Refills:  0        ALPRAZOLAM PO        Refills:  0        CIPROFLOXACIN PO        Refills:  0        DULOXETINE HCL PO        Refills:  0        etonogestrel 68 MG Impl   Commonly known as:  IMPLANON/NEXPLANON   Dose:  1 each   Indication:  Birth Control        1 each by Subdermal route once   Refills:  0        EXCEDRIN MIGRAINE 250-250-65 MG per tablet   Dose:  1 tablet   Generic drug:  aspirin-acetaminophen-caffeine        Take 1 tablet by mouth every 6 hours as needed.   Refills:  0        MAXALT 10 MG tablet   Dose:  10 mg   Generic drug:  rizatriptan        Take 10 mg by mouth at onset of headache.   Refills:  0        meloxicam 15 MG tablet   Commonly known as:  MOBIC   Dose:  15 mg   Quantity:  90 tablet        Take 1 tablet (15 mg) by mouth daily   Refills:  1        MULTIPLE VITAMIN PO   Dose:  1 tablet        Take 1 tablet by mouth daily.   Refills:  0        naproxen 375 MG tablet   Commonly known as:  NAPROSYN   Dose:  375 mg   Quantity:  60 tablet        Take 1 tablet (375 mg) by mouth 2 times daily (with meals)   Refills:  11        nitroFURantoin (macrocrystal-monohydrate) 100 MG capsule   Commonly known as:  MACROBID   Dose:  100 mg   Quantity:  14 capsule        Take 1 capsule (100 mg) by mouth 2 times daily   Refills:  0        omeprazole 40 MG capsule   Commonly known as:  priLOSEC   Dose:  40 mg   Quantity:  30 capsule        Take 1 capsule (40 mg) by mouth daily Take 30-60 minutes before a meal.   Refills:  1        ORTHO EVRA 150-35 MCG/24HR patch   Dose:  1 patch   Generic drug:  norelgestromin-ethinyl estradiol        Place 1 patch onto the skin once a week.   Refills:  0         PREDNISONE PO        Refills:  0        sucralfate 1 GM tablet   Commonly known as:  CARAFATE   Dose:  1 g   Quantity:  120 tablet        Take 1 tablet (1 g) by mouth 4 times daily   Refills:  5        tiZANidine 4 MG capsule   Commonly known as:  ZANAFLEX        Half tablet -one tablet daily as needed   Refills:  0                Prescriptions were sent or printed at these locations (1 Prescription)                   Other Prescriptions                Printed at Department/Unit printer (1 of 1)         oxyCODONE (ROXICODONE) 5 MG IR tablet                Procedures and tests performed during your visit     Anti Nuclear Rose IgG by IFA with Reflex    Anti Treponema    Basic metabolic panel    CBC with platelets    Erythrocyte sedimentation rate auto    MR Brain and Orbits    Peripheral IV catheter    Rheumatoid factor    Visual Acuity      Orders Needing Specimen Collection     None      Pending Results     Date and Time Order Name Status Description    9/5/2017 0340 Anti Treponema In process     9/5/2017 0340 Rheumatoid factor In process     9/5/2017 0334 Anti Nuclear Rose IgG by IFA with Reflex In process     9/5/2017 0334 Erythrocyte sedimentation rate auto In process     9/5/2017 0334 Basic metabolic panel In process     9/4/2017 2259 MR Brain and Orbits Preliminary             Pending Culture Results     No orders found for last 3 day(s).            Pending Results Instructions     If you had any lab results that were not finalized at the time of your Discharge, you can call the ED Lab Result RN at [phone]. You will be contacted by this team for any positive Lab results or changes in treatment. The nurses are available 7 days a week from 10A to 6:30P.  You can leave a message 24 hours per day and they will return your call.        Thank you for choosing [facility]       Thank you for choosing [facility] for your care. Our goal is always to provide you with excellent care. Hearing back from our patients is one  way we can continue to improve our services. Please take a few minutes to complete the written survey that you may receive in the mail after you visit with us. Thank you!        [employer] Information     [employer] gives you secure access to your electronic health record. If you see a primary care provider, you can also send messages to your care team and make appointments. If you have questions, please call your primary care clinic.  If you do not have a primary care provider, please call [phone] and they will assist you.        Care EveryWhere ID     This is your Care EveryWhere ID. This could be used by other organizations to access your [facility] medical records  [id number]        Equal Access to Services     DAVID HERNANDEZ : Yolanda Liz, love berkowitz, james burrell, shant ramos . So [facility] [phone].    ATENCIÓN: Si habla español, tiene a orona disposición servicios gratuitos de asistencia lingüística. Llame al [phone].    We comply with applicable federal civil rights laws and [place] laws. We do not discriminate on the basis of race, color, national origin, age, disability sex, sexual orientation or gender identity.            After Visit Summary       This is your record. Keep this with you and show to your community pharmacist(s) and doctor(s) at your next visit.

## 2017-09-05 ENCOUNTER — APPOINTMENT (OUTPATIENT)
Dept: MRI IMAGING | Facility: CLINIC | Age: 27
End: 2017-09-05
Payer: COMMERCIAL

## 2017-09-05 VITALS
SYSTOLIC BLOOD PRESSURE: 141 MMHG | HEIGHT: 65 IN | WEIGHT: 216 LBS | HEART RATE: 60 BPM | RESPIRATION RATE: 18 BRPM | DIASTOLIC BLOOD PRESSURE: 98 MMHG | OXYGEN SATURATION: 98 % | TEMPERATURE: 98.5 F | BODY MASS INDEX: 35.99 KG/M2

## 2017-09-05 DIAGNOSIS — H53.10 SUBJECTIVE VISUAL DISTURBANCE: Primary | ICD-10-CM

## 2017-09-05 LAB
ANION GAP SERPL CALCULATED.3IONS-SCNC: 9 MMOL/L (ref 3–14)
BUN SERPL-MCNC: 13 MG/DL (ref 7–30)
CALCIUM SERPL-MCNC: 8.8 MG/DL (ref 8.5–10.1)
CHLORIDE SERPL-SCNC: 105 MMOL/L (ref 94–109)
CO2 SERPL-SCNC: 26 MMOL/L (ref 20–32)
CREAT SERPL-MCNC: 0.76 MG/DL (ref 0.52–1.04)
ERYTHROCYTE [DISTWIDTH] IN BLOOD BY AUTOMATED COUNT: 13.3 % (ref 10–15)
ERYTHROCYTE [SEDIMENTATION RATE] IN BLOOD BY WESTERGREN METHOD: 9 MM/H (ref 0–20)
GFR SERPL CREATININE-BSD FRML MDRD: 90 ML/MIN/1.7M2
GLUCOSE SERPL-MCNC: 98 MG/DL (ref 70–99)
HCT VFR BLD AUTO: 37.1 % (ref 35–47)
HGB BLD-MCNC: 12.4 G/DL (ref 11.7–15.7)
MCH RBC QN AUTO: 28.7 PG (ref 26.5–33)
MCHC RBC AUTO-ENTMCNC: 33.4 G/DL (ref 31.5–36.5)
MCV RBC AUTO: 86 FL (ref 78–100)
PLATELET # BLD AUTO: 265 10E9/L (ref 150–450)
POTASSIUM SERPL-SCNC: 3.7 MMOL/L (ref 3.4–5.3)
RBC # BLD AUTO: 4.32 10E12/L (ref 3.8–5.2)
SODIUM SERPL-SCNC: 140 MMOL/L (ref 133–144)
WBC # BLD AUTO: 9 10E9/L (ref 4–11)

## 2017-09-05 PROCEDURE — A9585 GADOBUTROL INJECTION: HCPCS | Performed by: EMERGENCY MEDICINE

## 2017-09-05 PROCEDURE — 70553 MRI BRAIN STEM W/O & W/DYE: CPT

## 2017-09-05 PROCEDURE — 85652 RBC SED RATE AUTOMATED: CPT | Performed by: OPHTHALMOLOGY

## 2017-09-05 PROCEDURE — 80048 BASIC METABOLIC PNL TOTAL CA: CPT | Performed by: OPHTHALMOLOGY

## 2017-09-05 PROCEDURE — 86038 ANTINUCLEAR ANTIBODIES: CPT | Performed by: OPHTHALMOLOGY

## 2017-09-05 PROCEDURE — 86431 RHEUMATOID FACTOR QUANT: CPT | Performed by: OPHTHALMOLOGY

## 2017-09-05 PROCEDURE — 86039 ANTINUCLEAR ANTIBODIES (ANA): CPT | Performed by: OPHTHALMOLOGY

## 2017-09-05 PROCEDURE — 86780 TREPONEMA PALLIDUM: CPT | Performed by: OPHTHALMOLOGY

## 2017-09-05 PROCEDURE — 25000132 ZZH RX MED GY IP 250 OP 250 PS 637: Performed by: EMERGENCY MEDICINE

## 2017-09-05 PROCEDURE — 85027 COMPLETE CBC AUTOMATED: CPT | Performed by: OPHTHALMOLOGY

## 2017-09-05 PROCEDURE — 25000128 H RX IP 250 OP 636: Performed by: EMERGENCY MEDICINE

## 2017-09-05 RX ORDER — OXYCODONE HYDROCHLORIDE 5 MG/1
10 TABLET ORAL ONCE
Status: COMPLETED | OUTPATIENT
Start: 2017-09-05 | End: 2017-09-05

## 2017-09-05 RX ORDER — OXYCODONE HYDROCHLORIDE 5 MG/1
5-10 TABLET ORAL EVERY 6 HOURS PRN
Qty: 15 TABLET | Refills: 0 | Status: SHIPPED | OUTPATIENT
Start: 2017-09-05

## 2017-09-05 RX ORDER — GADOBUTROL 604.72 MG/ML
10 INJECTION INTRAVENOUS ONCE
Status: COMPLETED | OUTPATIENT
Start: 2017-09-05 | End: 2017-09-05

## 2017-09-05 RX ADMIN — OXYCODONE HYDROCHLORIDE 10 MG: 5 TABLET ORAL at 04:02

## 2017-09-05 RX ADMIN — GADOBUTROL 10 ML: 604.72 INJECTION INTRAVENOUS at 01:50

## 2017-09-05 NOTE — DISCHARGE INSTRUCTIONS
You will receive a call from the Eye Clinic (phone: [phone]) this morning with further recommendations. Return with any new or concerning symptoms.    Understanding Vision Problems     Normal     If your eyes are normal  Your eyes see objects as light. Your cornea focuses light rays onto a layer that lines the back of your eye (the retina). If your eyes are normal, this process produces a focused image on your retina. This makes objects look clear.     Nearsighted     If you re nearsighted  Your cornea and your retina are too far apart if you re nearsighted. Sometimes your cornea or your lens has an abnormal shape. This makes light rays from distant objects focus in front of your retina. These objects then look blurry.     Farsighted     If you re farsighted  Your cornea and your retina are too close together if you re farsighted. Sometimes your cornea or your lens has an abnormal shape. This makes light rays from close objects focus behind your retina. These objects then look blurry.     Astigmatism     If you have an astigmatism  Sometimes the curve of your cornea is uneven or the lens inside your eye is curved. Then light rays can t focus evenly onto your retina. This is called an astigmatism. It makes both close and distant objects look blurry.  Date Last Reviewed: 6/11/2015 2000-2017 [employer]. [address] [place] [address], [place] [address]. Todos los derechos reservados. Esta información no pretende sustituir la atención médica profesional. Sólo orona médico puede diagnosticar y tratar un problema de magalis.          How the Eye Works    Sharp vision depends on many factors. The parts of the eye work together to refract, or bend, and focus light rays. For normal vision, light must focus onto the retina.   Cornea  Light enters the eye through this clear, dome-shaped tissue. The cornea also bends light rays to help focus them. Problems with the cornea's shape can affect vision.  Pupil  This  circular window in the center of the iris opens and closes to let the right amount of light into the eye.  Iris  This is the colored part of the eye. It contains muscles that dilate or open, and constrict or close, the pupil.  Lens  This disc of clear tissue behind the pupil changes shape, or accommodates, to help focus light.  Retina  This thin layer of light-sensitive tissue lines the inside of the eye. The retina sends signals to the optic nerve.  Optic nerve  This nerve carries signals from the retina to the brain. The brain then interprets these signals to make images. These images are what you see.  Date Last Reviewed: 9/9/2015 2000-2017 [employer]. [address] [place] [address], [place] [address]. All rights reserved. This information is not intended as a substitute for professional medical care. Always follow your healthcare professional's instructions.

## 2017-09-05 NOTE — ED PROVIDER NOTES
[place] EMERGENCY DEPARTMENT ([facility])  9/04/17 ED 11   History     Chief Complaint   Patient presents with     Loss of Vision     Eye Pain     The history is provided by the patient and medical records.     Fanny Sultana is a 27 year old female who presents with progressive vision loss in her right eye for the past 3-4 days. 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 Patient was diagnosed shingles on 8/24/17 with oral sores on the roof of her mouth and inside of her cheeks. They did do a blood test at urgent care that was positive for herpes zoster. She was treated with valtrex but developed reaction to this. She was switched to acyclovir and prednisone and the oral lesions went away. However 3-4 days ago she developed some vision loss in different spots in the field of vision of her right eye. She saw an optometrist at [facility] on 9/1/17 who performed eye exam and did not find any dendritic lesions. He also di dnot find any cause for the pain and blurred vision. Patient was told to call back on Tuesday to report her condition and was discharged to home. Patient states that over this past weekend her right eye vision and pain has worsened. She has burning, stabbing pain of the right eye. She can see some light but everything else is black. She cannot see colors in her right eye. Patient contacted [facility] service, got in touch with on-call Ophthalmology who told her he d meet her here.  No fevers, congestion, rhinorrhea. Patient states she was diagnosed with idiopathic trochleitis/trochlear nerve inflammation in the distant past, which manifested as pain to right inner corner of eye. Patient was treated with gabapentin, steroid injections and eventually this improved. She has had some visual problems in the past but never this bad.  No history of lupus or other autoimmune disease.     I have  "reviewed the Medications, Allergies, Past Medical and Surgical History, and Social History in the Epic system.    Review of Systems   Eyes: Positive for pain and visual disturbance. Negative for photophobia, discharge, redness and itching.   Gastrointestinal: Negative for nausea and vomiting.   Endocrine: Negative for polydipsia and polyuria.   Genitourinary: Positive for vaginal bleeding.   Musculoskeletal: Negative for neck pain and neck stiffness.   Skin: Negative for rash.   Allergic/Immunologic: Negative for immunocompromised state.   Neurological: Negative for headaches.   Hematological: Negative for adenopathy. Does not bruise/bleed easily.   All other systems reviewed and are negative.      Physical Exam   BP: 129/79  Heart Rate: 71  Temp: 98.5  F (36.9  C)  Height: 165.1 cm (5' 5\")  Weight: 98 kg (216 lb)  SpO2: 99 %  Physical Exam   Constitutional: She is oriented to person, place, and time. She appears well-developed and well-nourished. No distress.   HENT:   Head: Normocephalic and atraumatic.   Right Ear: External ear normal.   Left Ear: External ear normal.   Nose: Nose normal.   Mouth/Throat: Oropharynx is clear and moist. No oropharyngeal exudate.   Eyes: Conjunctivae and EOM are normal. Pupils are equal, round, and reactive to light. Right eye exhibits no discharge. Left eye exhibits no discharge. No scleral icterus.   No fluorescein dye uptake in right eye. Decreased visual acuity in right eye; patient able to see shadows and light. No hyphema.   Neck: Normal range of motion. Neck supple.   Cardiovascular: Normal rate.    Pulmonary/Chest: Effort normal. No respiratory distress.   Musculoskeletal: Normal range of motion.   Lymphadenopathy:     She has no cervical adenopathy.   Neurological: She is alert and oriented to person, place, and time. No cranial nerve deficit. She exhibits normal muscle tone. Coordination normal.   Skin: Skin is warm. No rash noted. She is not diaphoretic.   Psychiatric: " She has a normal mood and affect. Her behavior is normal. Judgment and thought content normal.   Nursing note and vitals reviewed.      ED Course     ED Course     Procedures             Critical Care time:  none             Labs Ordered and Resulted from Time of ED Arrival Up to the Time of Departure from the ED - No data to display         Assessments & Plan (with Medical Decision Making)   27-year-old woman presenting with pain and loss of vision in right eye for 4 days. Differential diagnosis: herpes zoster ophthalmicus, uveitis, iritis, unlikely corneal ulcer or abrasion.    After thorough history and physical examination, the patient appears to be in no acute distress.  She has decreased visual acuity in the right eye. Upon reviewing her medical records it appears that she has bilateral trochleitis approximately 2.5 years ago. I consulted Ophthalmology who will come and evaluate the patient in the Emergency Department.    Patient was seen and evaluated by the Ophthalmology resident, Dr. Rincon. Her ophthalmologic examination shows no abnormalities whatsoever in her right eye. However, due to strange symptoms and the progression, recommendation was made to obtain MRI of the orbits and brain. I will order this study. The plan was made that if the imaging is unremarkable patient will be discharged with outpatient Neuro-Ophthalmology follow-up tomorrow.    12:06 AM  Patient awaiting MRI. She will be signed out to my partner pending results, reevaluation, and disposition.    This part of the document was transcribed by Nafisa Howe Medical Scribe.      I have reviewed the nursing notes.    I have reviewed the findings, diagnosis, plan and need for follow up with the patient.    New Prescriptions    No medications on file       Final diagnoses:   Visual disturbance   Vision loss of right eye     I, Nafisa Howe, am serving as a trained medical scribe to document services personally performed by Elmer Zelaya MD,  based on the provider's statements to me.  This document has been checked and approved by the attending provider.     I, Anthony Zelaya MD, was physically present and have reviewed and verified the accuracy of this note documented by fatemeh Loyd scribe.    9/4/2017   [facility], [place], EMERGENCY DEPARTMENT     Anthony Zelaya MD  09/05/17 0045

## 2017-09-05 NOTE — ED NOTES
Pt arrived to the ED with loss of vision and eye pain in her right eye. Per pt she was recently treated for shingles in her mouth, the lesions have scabbed over. Pt noticed right eye vision changes on Thursday that progressively worsened over the weekend. She call the ophthalmologist who directed her to home to the ED. On arrival vitals stable, afebrile.

## 2017-09-05 NOTE — ED PROVIDER NOTES
The patient was accepted at shift change sign out pending MRI results.  The MRI did show increased FLAIR signal of the right intraconal fat with corresponding contrast enhancement.  Differential includes Esteban Hunt syndrome, post septal cellulitis, or artifact.  The remainder of the brain was normal.  This was reviewed with neurology, they don't think this is a neurologic issue, rather an ophthalmologic issue.  I did review this with ophthalmologist, Dr. Salmon, who also reviewed the images, in addition to discussing the case with his senior.  They would like the patient to continue her steroid, be provided some pain relief, and they have ordered additional labs.  They state that the lab results will not change their plan tonight, so she can leave prior to results coming back.  They feel she is safe to discharge home, and have a very low suspicion for infection.  They will review the images with a neural ophthalmologist 1st thing in the morning, call the patient with further directions.  This was discussed the patient and her mother, who were agreeable to plan.    Dictation Disclaimer: Some of this Note has been completed with voice-recognition dictation software. Although errors are generally corrected real-time, there is the potential for a rare error to be present in the completed chart.       Debbie Canseco MD  09/05/17 0407

## 2017-09-05 NOTE — CONSULTS
OPHTHALMOLOGY CONSULT NOTE  09/04/17    Patient: Fanny Sultana  Consulted by: ED Staff  Reason for Consult: Vision Loss OD    HISTORY OF PRESENTING ILLNESS:     Fanny Sultana is a 27 year old female seen previously for bilateral trochleitis and functional vision loss OS by Dr. George and Daniel, respectively, who presents today with a 5 day history of painful, progressive vision loss OD after being diagnosed with shingles weeks prior. Weeks ago she went to the dentist for sores in her mouth (right side) who sent her to urgent care who diagnosed her with shingles and placed her on Valtrex 1000 mg TID. She had adverse reactions to the valtrex including dizziness and confusion and returned to urgent care last week who placed her on acyclovir 500 mg 5x/day. Last Thursday she noticed black spots in her right eye with ocular pain. On Friday the pain and vision worsened and she visited an optometrist who noted a completely normal exam. She visited urgent care who added oral prednisone 40 mg BID for 5 days. Her optometrist told her to return Tuesday for repeat exam or to go to the ER if symptoms worsen. She visited the [facility] last night for worsening vision and pain OD who noted a normal exam and discharged her with instructions to f/u with ophthalmology. She comes to our ER today with complains of total black vision OD with ocular pain. No facial rash, no flashes or floaters. She states the vision became totally black yesterday and she has a burning/painful sensation in the eye.     She denies significant neurological deficits including no prior episodes of blurred vision OD, no ambulating difficulties, no motor deficits, and no slurred speech. She states she had some numbness and tingling in the right arm today.     Review of systems were otherwise negative except for that which has been stated above.      OCULAR/MEDICAL/SURGICAL HISTORIES:     Past Ocular History:  Myopia  Bilateral trochleitis evaluated by   [facility] and at [facility], unresponsive to NSAIDs or Dex/Lido injections  Functional vision loss OS in 2015    Past Medical History:   Diagnosis Date     Anxiety      Dermoid      Migraines        Past Surgical History:   Procedure Laterality Date     lpsy left ovarina cystectomy  10/11    dermoid     SUBTENON'S/PERIOCULAR STERIOD  OD (RIGHT EYE)      x 4     SUBTENON'S/PERIOCULAR STERIOD OS (LEFT EYE)      x 4       EXAMINATION:     Visual Acuity: CC Near Card    Right Eye LP ; Left Eye 20/20   Pupils: Equal and reactive to light and accomodation with no afferent pupillary defect.    Intraocular Pressure: RE  16 ; LE 16  mmHg by tonopen (<5% error).   Motility: RE Full; LE Full  Confrontational Visual Field: RE LP vision; LE Full     External/Slit Lamp Exam   RIGHT EYE   Lids/Lashes: No Abnormality   Conj/Sclera: White and Quiet   Cornea:  Clear, no staining / dendrites   Ant Chamber:  Deep and Quiet, no cell and flare   Iris: Round and Reactive   Lens: Clear  LEFT   Lids/lashes: No Abnormality   Conj/Sclera: White and Quiet   Cornea:  Clear   Ant Chamber:  Deep and Quiet   Iris: Round and Reactive   Lens:Clear    Dilated Fundus Exam  Eyes Dilated? Yes   Time: 9:20 PM With Phenylephrine 2.5% and Mydriacyl 1%    (Normally, dilation with the above lasts about 4-6 hours)  RIGHT:   Anterior Vitreous: Clear   Media: Clear   Optic Nerve: Normal Contours and Size, No Pallor or disc swelling, Crisp disc margins   Cup to Disc Ratio: 0.15   Macula: Flat and No Pigment Abnormality   Vessels: Normal Caliber and Distribution   Retinal Periphery: No Holes or Tears Identified  LEFT:   Anterior Vitreous: Clear   Media: Clear   Optic Nerve: Normal Contours and Size, Tilted   Cup to Disc Ratio: 0.15   Macula: Flat and No Pigment Abnormality   Vessels: Normal Caliber and Distribution   Retinal Periphery: No Holes or Tears Identified    Labs/Studies/Imaging Performed  MRI Brain/Orbits: Pending     ASSESSMENT/PLAN:     Fanny Sultana is a 27  "year old female who presents    1. Vision loss OD   - Unclear etiology. Differential includes optic neuritis given her age and rapidly progressing symptoms, possibly functional given history of functional vision loss OS but this is a diagnosis of exclusion. Normal anterior and posterior exam without signs of optic neuropathy. No RAPD. Can see normal disc in 2/3 of optic neuritis patients   - MRI Brain to rule out MS / optic neuritis / lesion   - If optic neuritis seen: IV steroids (1 g/day methylprednisolone 3 days) with gastric prophylaxis, followed by prednisone 1 mg/kg/day p.o. for 11 days, followed by 4 day taper   - Follow-up this week with neuro-ophthalmology clinic    2. Trochleitis OU   - Still recurs; was reevaluated at [place] who tried similar antiinflammatories and gabapentin, none of which improved her symptoms. Indomethacin reportedly helped in past but caused GI distress. Mobic failed.   - Current ocular pain is different per patient    3. H/o nonorganic vison loss OS   - Resolved per patient on own, no complaints    4. Myopia    ---  Addendum: received preliminary results of MRI brain/orbit: \"no optic neuritis but increased FLAIR signal of right intraconal fat w/ corresponding contrast enhancement. Ddx includes orbital pseudotumor (Esteban Madden syndrome), post-septal orbital cellulitis, or artifact.\"    I proceeded to order additional labs: CBC, BMP, ESR, ADRIEL, RF, and RPR. She does not have an inflammatory picture (normal lids, no injection, no cell/flare, no vitritis, normal WBC, ESR pending). Esteban Madden unlikely as normal EOM.     Upon further chart review, she was recently seen earlier this month for extensive negative medical workup for abdominal pain and noted to have a strong narcotic use history and gets 60 Percocet every two weeks from her primary care provider, and has had a couple of Norco prescriptions in the prior week as well. There may be a component drug seeking behavior. She had two " doses of Dilaudid in the ED earlier this month still complaining of pain. The abdominal pain apparently resolved on its own as she denied any to me today.     Will discuss with neuro-ophthalmology in AM and will schedule patient for follow-up.    It is our pleasure to participate in this patient's care and treatment. Please contact us with any further questions or concerns.    Boy Rincon M.D. PGY-2  Ophthalmology    I have not seen the patient but I have discussed the case with the resident and Dr. Camacho. I reviewed the Magnetic Resonance Images of the brain and orbit and there is no enhancement of the optic nerve on either side. Fat stranding on the right side is most likely an artefact given the absence of inflammatory signs. Patient will be scheduled to see Dr. Sanchez as an outpatient in the next 2 weeks.  Robbin Shaffer M.D.  Clinical fellow of Neuro-Ophthalmology  [facility]

## 2017-09-06 ENCOUNTER — OFFICE VISIT (OUTPATIENT)
Dept: OPHTHALMOLOGY | Facility: CLINIC | Age: 27
End: 2017-09-06

## 2017-09-06 DIAGNOSIS — H53.10 SUBJECTIVE VISION DISTURBANCE: ICD-10-CM

## 2017-09-06 DIAGNOSIS — H57.11 PAIN AROUND EYE, RIGHT: Primary | ICD-10-CM

## 2017-09-06 LAB — RHEUMATOID FACT SER NEPH-ACNC: <20 IU/ML (ref 0–20)

## 2017-09-06 ASSESSMENT — REFRACTION_WEARINGRX
OD_AXIS: 093
OD_SPHERE: -6.50
OS_SPHERE: -6.00
OD_CYLINDER: +0.75
OS_CYLINDER: +0.75
SPECS_TYPE: SVL
OS_AXIS: 096

## 2017-09-06 ASSESSMENT — VISUAL ACUITY
OD_CC: LP
OS_CC: 20/20
CORRECTION_TYPE: GLASSES
METHOD: SNELLEN - LINEAR

## 2017-09-06 ASSESSMENT — TONOMETRY
IOP_METHOD: ICARE
OS_IOP_MMHG: 13
OD_IOP_MMHG: 13

## 2017-09-06 ASSESSMENT — CUP TO DISC RATIO
OS_RATIO: 0.3
OD_RATIO: 0.3

## 2017-09-06 ASSESSMENT — CONF VISUAL FIELD
OD_SUPERIOR_TEMPORAL_RESTRICTION: 1
OD_INFERIOR_TEMPORAL_RESTRICTION: 1
OD_INFERIOR_NASAL_RESTRICTION: 1
OD_SUPERIOR_NASAL_RESTRICTION: 1
OS_NORMAL: 1

## 2017-09-06 ASSESSMENT — SLIT LAMP EXAM - LIDS
COMMENTS: NORMAL
COMMENTS: NORMAL

## 2017-09-06 NOTE — MR AVS SNAPSHOT
After Visit Summary   9/6/2017    Fanny Sultana    MRN: [id number]           Patient Information     Date Of Birth          1990        Visit Information        Provider Department      9/6/2017 2:30 PM Oz Sanchez MD  [facility] Ophthal[facility]        Today's Diagnoses     Pain around eye, right    -  1    Subjective vision disturbance           Follow-ups after your visit        Follow-up notes from your care team     Return in about 6 weeks (around 10/18/2017) for Vision, color, tension, dilate, RNFL, LVC.      Your next 10 appointments already scheduled     Oct 18, 2017 10:00 AM CDT   (Arrive by 9:45 AM)   RETURN NEURO with Oz Sanchez MD   [facility] ([facility])    [address]  [address] [facility] [address] [phone]              Future tests that were ordered for you today     Open Future Orders        Priority Expected Expires Ordered    IOP Measurement Routine  3/9/2019 9/5/2017    Color Vision - Screening OU (both eyes) Routine  3/9/2019 9/5/2017    Glaucoma Top OU Routine  3/9/2019 9/5/2017    OCT Optic Nerve RNFL Spectralis OU (both eyes) Routine  3/9/2019 9/5/2017            Who to contact     Please call your clinic at [phone] to:    Ask questions about your health    Make or cancel appointments    Discuss your medicines    Learn about your test results    Speak to your doctor   If you have compliments or concerns about an experience at your clinic, or if you wish to file a complaint, please contact [facility] Patient Relations at [phone] or email us at [email or web address]         Additional Information About Your Visit        MyChart Information     [employer] gives you secure access to your electronic health record. If you see a primary care provider, you can also send messages to your care team and make appointments. If you have questions, please call your  primary care clinic.  If you do not have a primary care provider, please call [phone] and they will assist you.      [employer] is an electronic gateway that provides easy, online access to your medical records. With [employer] you can request a clinic appointment, read your test results, renew a prescription or communicate with your care team.     To access your existing account, please contact your [facility] or call [phone] for assistance.        Care EveryWhere ID     This is your Care EveryWhere ID. This could be used by other organizations to access your [facility] medical records  [id number]         Blood Pressure from Last 3 Encounters:   09/05/17 (!) 141/98   08/03/17 129/84   03/09/15 119/71    Weight from Last 3 Encounters:   09/04/17 98 kg (216 lb)   08/03/17 99.8 kg (220 lb)   03/09/15 88 kg (194 lb 0.1 oz)              Today, you had the following     No orders found for display       Primary Care Provider Office Phone # Fax #    Lydia Santoyo PA-C [phone]       [facility] [address] [address]        Equal Access to Services     ARUNA HERNANDEZ : Hadii aad ku hadasho Soomaali, waaxda luqadaha, qaybta kaalmada adeegyada, shant barlwoin haydionten sarai malone. So [facility] [phone].    ATENCIÓN: Si habla español, tiene a orona disposición servicios gratuitos de asistencia lingüística. Charlie al [phone].    We comply with applicable federal civil rights laws and [place] laws. We do not discriminate on the basis of race, color, national origin, age, disability sex, sexual orientation or gender identity.            Thank you!     Thank you for choosing [facility]  for your care. Our goal is always to provide you with excellent care. Hearing back from our patients is one way we can continue to improve our services. Please take a few minutes to complete the written survey that you may receive in the mail after your  visit with us. Thank you!             Your Updated Medication List - Protect others around you: Learn how to safely use, store and throw away your medicines at www.disposemymeds.org.          This list is accurate as of: 9/6/17  3:46 PM.  Always use your most recent med list.                   Brand Name Dispense Instructions for use Diagnosis    ACYCLOVIR PO      Take 400 mg by mouth 5 times daily        ALPRAZOLAM PO           CIPROFLOXACIN PO           DULOXETINE HCL PO           etonogestrel 68 MG Impl    IMPLANON/NEXPLANON     1 each by Subdermal route once        EXCEDRIN MIGRAINE 250-250-65 MG per tablet   Generic drug:  aspirin-acetaminophen-caffeine      Take 1 tablet by mouth every 6 hours as needed.        MAXALT 10 MG tablet   Generic drug:  rizatriptan      Take 10 mg by mouth at onset of headache.        meloxicam 15 MG tablet    MOBIC    90 tablet    Take 1 tablet (15 mg) by mouth daily    Unspecified acute inflammation of orbit, Pain in or around eye, bilateral       MULTIPLE VITAMIN PO      Take 1 tablet by mouth daily.        naproxen 375 MG tablet    NAPROSYN    60 tablet    Take 1 tablet (375 mg) by mouth 2 times daily (with meals)    Pain in or around eye, bilateral, Unspecified acute inflammation of orbit       nitroFURantoin (macrocrystal-monohydrate) 100 MG capsule    MACROBID    14 capsule    Take 1 capsule (100 mg) by mouth 2 times daily        omeprazole 40 MG capsule    priLOSEC    30 capsule    Take 1 capsule (40 mg) by mouth daily Take 30-60 minutes before a meal.    Unspecified acute inflammation of orbit       ORTHO EVRA 150-35 MCG/24HR patch   Generic drug:  norelgestromin-ethinyl estradiol      Place 1 patch onto the skin once a week.        oxyCODONE 5 MG IR tablet    ROXICODONE    15 tablet    Take 1-2 tablets (5-10 mg) by mouth every 6 hours as needed for pain        PREDNISONE PO           sucralfate 1 GM tablet    CARAFATE    120 tablet    Take 1 tablet (1 g) by mouth 4 times  daily    Gastritis       tiZANidine 4 MG capsule    ZANAFLEX     Half tablet -one tablet daily as needed

## 2017-09-06 NOTE — NURSING NOTE
Chief Complaints and History of Present Illnesses   Patient presents with     Decreased Vision Right Eye     HPI    Affected eye(s):  Right   Symptoms:     Blurred vision   Decreased vision      Frequency:  Constant       Do you have eye pain now?:  Yes   Location:  OD   Pain Level:  Severe Pain (6)   Pain Frequency:  Constant   Pain Characteristics:  Stabbing, Burning      Comments:  8/31/2017 noticed spots in vision, and gradually got worse.   On 9/1/2017 was in urgent care and they prescribed Prednisone 40mg BID PO for 5 days.  On 9/4/2017 saw on call eye doctor in ED and MRI & LABS. Pt states unable to see at all out of right eye- just some light.     2 weeks ago was diagnosed with Shingles- is on Acyclovir 400mg 5x/day PO.    Elder Green COT 2:44 PM September 6, 2017

## 2017-09-06 NOTE — PROGRESS NOTES
Assessment & Plan     Fanny Sultana is a 27 year old female with the following diagnoses:   1. Pain around eye, right    2. Subjective vision disturbance       She has reduced vision in the RIGHT eye.  Her acuity is light perception but her pupil is normal in reactivity and does not have an afferent pupillary defect.  She saw two of the 20/50 letters when a 5 base down prism was placed in front of the LEFT eye.  This would suggest that her visual acuity is 20/50 in the RIGHT eye at least.  I reassured her that her vision should improve spontaneously.      She has significant right eye pain.  This has been there for a couple of years.  If it is still there in 6 weeks, will refer her to a headache specialist or a pain specialist.      Follow up 6 weeks sooner as needed for worsening symptoms.            Attending Physician Attestation:  Complete documentation of historical and exam elements from today's encounter can be found in the full encounter summary report (not reduplicated in this progress note).  I personally obtained the chief complaint(s) and history of present illness.  I confirmed and edited as necessary the review of systems, past medical/surgical history, family history, social history, and examination findings as documented by others; and I examined the patient myself.  I personally reviewed the relevant tests, images, and reports as documented above.  I formulated and edited as necessary the assessment and plan and discussed the findings and management plan with the patient and family. - Oz Sanchez MD

## 2017-09-07 ENCOUNTER — TELEPHONE (OUTPATIENT)
Dept: EMERGENCY MEDICINE | Facility: CLINIC | Age: 27
End: 2017-09-07

## 2017-09-07 LAB
ANA PAT SER IF-IMP: ABNORMAL
ANA SER QL IF: ABNORMAL
ANA TITR SER IF: ABNORMAL {TITER}
T PALLIDUM IGG+IGM SER QL: NEGATIVE

## 2017-09-15 ENCOUNTER — COMMUNICATION - HEALTHEAST (OUTPATIENT)
Dept: UROLOGY | Facility: CLINIC | Age: 27
End: 2017-09-15

## 2017-10-30 ENCOUNTER — TRANSFERRED RECORDS (OUTPATIENT)
Dept: HEALTH INFORMATION MANAGEMENT | Facility: CLINIC | Age: 27
End: 2017-10-30

## 2017-11-03 ENCOUNTER — MEDICAL CORRESPONDENCE (OUTPATIENT)
Dept: HEALTH INFORMATION MANAGEMENT | Facility: CLINIC | Age: 27
End: 2017-11-03

## 2017-11-08 ENCOUNTER — DOCUMENTATION ONLY (OUTPATIENT)
Dept: GASTROENTEROLOGY | Facility: CLINIC | Age: 27
End: 2017-11-08

## 2017-11-08 NOTE — PROGRESS NOTES
GI notes or primary provider notes related to GI problem  Y     Pathology reports N    Recent Lab  Reports Y    Radiology Reports (CT?MRI) Y    Endoscopy Y    Colonoscopy Y    Referring GI Physician Name     Referring PCP Name GEETA HOBBS PA-C [facility]    Notes RUQ Pain    Referral Date 11/3/17    Date Complete Records Received 11/3/17    Date records scanned into epic  11/8/17    Provider Review Date     Date review routed back to Tammie     Letter sent       Notes

## 2017-11-19 ENCOUNTER — HEALTH MAINTENANCE LETTER (OUTPATIENT)
Age: 27
End: 2017-11-19

## 2017-12-08 NOTE — PROGRESS NOTES
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   Prior work-up including colonoscopy, CT imaging at [facility].  Per primary clinic normal pelvic US, though gynecology work-up recommended for pain.  Normal CBC, BMP, LFTs in March 2017.     Recommendation:    Do not schedule--send letter to patient with other list of GI providers - CC to PCP    When to schedule:  NA    Date patient was contacted regarding scheduling:     Comments:

## 2020-03-02 ENCOUNTER — HEALTH MAINTENANCE LETTER (OUTPATIENT)
Age: 30
End: 2020-03-02

## 2020-12-14 ENCOUNTER — HEALTH MAINTENANCE LETTER (OUTPATIENT)
Age: 30
End: 2020-12-14

## 2021-04-18 ENCOUNTER — HEALTH MAINTENANCE LETTER (OUTPATIENT)
Age: 31
End: 2021-04-18

## 2021-10-02 ENCOUNTER — HEALTH MAINTENANCE LETTER (OUTPATIENT)
Age: 31
End: 2021-10-02

## 2022-05-14 ENCOUNTER — HEALTH MAINTENANCE LETTER (OUTPATIENT)
Age: 32
End: 2022-05-14

## 2022-09-03 ENCOUNTER — HEALTH MAINTENANCE LETTER (OUTPATIENT)
Age: 32
End: 2022-09-03

## 2023-06-03 ENCOUNTER — HEALTH MAINTENANCE LETTER (OUTPATIENT)
Age: 33
End: 2023-06-03

## 2024-08-14 ENCOUNTER — MEDICAL CORRESPONDENCE (OUTPATIENT)
Dept: HEALTH INFORMATION MANAGEMENT | Facility: CLINIC | Age: 34
End: 2024-08-14

## 2024-08-14 ENCOUNTER — TRANSFERRED RECORDS (OUTPATIENT)
Dept: HEALTH INFORMATION MANAGEMENT | Facility: CLINIC | Age: 34
End: 2024-08-14

## 2024-09-10 ENCOUNTER — OFFICE VISIT (OUTPATIENT)
Dept: SURGERY | Facility: CLINIC | Age: 34
End: 2024-09-10
Payer: COMMERCIAL

## 2024-09-10 VITALS
OXYGEN SATURATION: 98 % | WEIGHT: 280 LBS | HEIGHT: 65 IN | HEART RATE: 101 BPM | SYSTOLIC BLOOD PRESSURE: 140 MMHG | BODY MASS INDEX: 46.65 KG/M2 | DIASTOLIC BLOOD PRESSURE: 100 MMHG

## 2024-09-10 DIAGNOSIS — R92.30 DENSE BREAST: ICD-10-CM

## 2024-09-10 DIAGNOSIS — N64.4 BREAST PAIN: ICD-10-CM

## 2024-09-10 DIAGNOSIS — Z91.89 AT HIGH RISK FOR BREAST CANCER: ICD-10-CM

## 2024-09-10 DIAGNOSIS — R92.2 INCONCLUSIVE MAMMOGRAM: Primary | ICD-10-CM

## 2024-09-10 PROCEDURE — 99203 OFFICE O/P NEW LOW 30 MIN: CPT | Performed by: SURGERY

## 2024-09-10 RX ORDER — DIAZEPAM 5 MG
5 TABLET ORAL EVERY 6 HOURS PRN
Qty: 2 TABLET | Refills: 0 | Status: SHIPPED | OUTPATIENT
Start: 2024-09-10

## 2024-09-10 NOTE — PROGRESS NOTES
Breast Health History Form    Do you have any of the following symptoms?  Breast pain     In which breast are you having the symptoms?  right  Have you had a mammogram?     Yes [facility] 7/2024     Have you ever had a breast cyst drained?  no        Have you had a breast biopsy in the past?  no        Have you ever had Breast Cancer?  no        Is there a history of Breast Cancer in your family?  Yes p-grandma m-grandma     Have you ever had Ovarian Cancer?  no     Is there a history of Ovarian Cancer in your family?no       Is there a history of Colon Cancer in your family?  M- grandma p- grandpa     Is there a history of Uterine Cancer in your family?  no     Any known genetic abnormalities in your family?  no     Summarize your caffeine intake?  Soda 1-3 daily    Were you born with a uterus?    What age did your periods begin?  10  Date your last menstrual period began?  unsure  Number of full term pregnancies?  0    Your age when your first child was born?  N/a  Did you nurse your children?  N/a  Are you pregnant now?  no  Have you begun Menopause?  no     Have you had either ovary removed?  no     Have you had an intrauterine device (IUD) placed?  no         Do you have breast implants?  no  Have you used hormone replacement therapy?  no        Have you taken oral contraceptive pills?  Yea 6     What is your current bra size?  42G

## 2024-09-10 NOTE — PROGRESS NOTES
[facility] Breast Surgery Consultation    HPI:   Fanny Sultana is a 34 year old female who is seen in consultation at the request of Dr. Wang for evaluation of breast pain.     She reports that around March of this year she began having pain in her right breast.  It tends to be all at the same location but can radiate from the nipple more laterally and medially.  She denies any issues with nipple discharge or drainage.  She has no symptoms on her left breast.  She has not had any change in medications.  She has tried limiting caffeine and tried new bras.  She has a family history of breast cancer in her paternal and maternal grandmothers.  She has not had any breast biopsies or surgeries.    She had diagnostic breast imaging on 6/12/2024 which revealed a 5 cm focal asymmetry in the upper central right breast near 12:00.  Ultrasound evaluation of this area was benign and it was felt to be likely an island of fibroglandular tissue.  She was recommended to have 6-month follow-up.    Hormonal history:   menarche 10, no children, pre-menopausal, 6 years OCP use, no HRT, no fertility treatment.     Family history of breast cancer: Yes -maternal and paternal grandmothers  Family history of ovarian cancer:  No  Family history of colon cancer: No  Family history of prostate cancer: No      Past Medical History:   has a past medical history of Anxiety, Dermoid, and Migraines.      Current Outpatient Medications:     ACYCLOVIR PO, Take 400 mg by mouth 5 times daily, Disp: , Rfl:     ALPRAZOLAM PO, , Disp: , Rfl:     aspirin-acetaminophen-caffeine (EXCEDRIN MIGRAINE) 250-250-65 MG per tablet, Take 1 tablet by mouth every 6 hours as needed., Disp: , Rfl:     CIPROFLOXACIN PO, , Disp: , Rfl:     DULOXETINE HCL PO, , Disp: , Rfl:     etonogestrel (IMPLANON/NEXPLANON) 68 MG IMPL, 1 each by Subdermal route once, Disp: , Rfl:     meloxicam (MOBIC) 15 MG tablet, Take 1 tablet (15 mg) by mouth daily, Disp: 90  tablet, Rfl: 1    MULTIPLE VITAMIN PO, Take 1 tablet by mouth daily., Disp: , Rfl:     naproxen (NAPROSYN) 375 MG tablet, Take 1 tablet (375 mg) by mouth 2 times daily (with meals), Disp: 60 tablet, Rfl: 11    nitroFURantoin, macrocrystal-monohydrate, (MACROBID) 100 MG capsule, Take 1 capsule (100 mg) by mouth 2 times daily, Disp: 14 capsule, Rfl: 0    norelgestromin-ethinyl estradiol (ORTHO EVRA) 150-20 MCG/24HR patch, Place 1 patch onto the skin once a week., Disp: , Rfl:     omeprazole (PRILOSEC) 40 MG capsule, Take 1 capsule (40 mg) by mouth daily Take 30-60 minutes before a meal., Disp: 30 capsule, Rfl: 1    oxyCODONE (ROXICODONE) 5 MG IR tablet, Take 1-2 tablets (5-10 mg) by mouth every 6 hours as needed for pain, Disp: 15 tablet, Rfl: 0    PREDNISONE PO, , Disp: , Rfl:     rizatriptan (MAXALT) 10 MG tablet, Take 10 mg by mouth at onset of headache., Disp: , Rfl:     sucralfate (CARAFATE) 1 GM tablet, Take 1 tablet (1 g) by mouth 4 times daily, Disp: 120 tablet, Rfl: 5    tiZANidine (ZANAFLEX) 4 MG capsule, Half tablet -one tablet daily as needed, Disp: , Rfl:     Past Surgical History:  Past Surgical History:   Procedure Laterality Date    lpsy left ovarina cystectomy  10/11    dermoid    SUBTENON'S/PERIOCULAR STERIOD  OD (RIGHT EYE)      x 4    SUBTENON'S/PERIOCULAR STERIOD OS (LEFT EYE)      x 4         No Known Allergies      Social History:  Social History     Socioeconomic History    Marital status: Single     Spouse name: Not on file    Number of children: Not on file    Years of education: Not on file    Highest education level: Not on file   Occupational History    Not on file   Tobacco Use    Smoking status: Passive Smoke Exposure - Never Smoker    Smokeless tobacco: Never   Substance and Sexual Activity    Alcohol use: No     Alcohol/week: 0.8 standard drinks of alcohol     Types: 1 Standard drinks or equivalent per week    Drug use: No    Sexual activity: Yes     Partners: Male     Birth  "control/protection: Patch   Other Topics Concern    Not on file   Social History Narrative    Not on file     Social Determinants of Health     Financial Resource Strain: Not on file   Food Insecurity: Not on file   Transportation Needs: Not on file   Physical Activity: Not on file   Stress: Not on file   Social Connections: Not on file   Interpersonal Safety: Not on file   Housing Stability: Not on file        ROS:  The 10 point review of systems is negative other than noted in the HPI and above.    PE:  Vitals: BP (!) 140/100   Pulse 101   Ht 1.651 m (5' 5\")   Wt 127 kg (280 lb)   SpO2 98%   BMI 46.59 kg/m    General appearance: well-nourished, sitting comfortably, no apparent distress  Psych: normal affect, pleasant  HEENT:  Head normocephalic and atraumatic, pupils equal and round, conjunctivae clear, mucous membranes moist, external ears and nose normal  Neck: Supple without thyromegaly or masses  Lungs: Respirations unlabored  Lymphatic: No cervical, or supraclavicular lymphadenopathy  Extremities: Without edema  Musculoskeletal:  Normal station and gait  Neurologic: nonfocal, grossly intact times four extremities, alert and oriented times three  Psychiatric: Mood and affect are appropriate  Skin: Without lesions or rashes    Breast:  A bilateral breast exam was performed in the supine position.. Bilateral breasts were palpated in a circumferential clockwise fashion including the supraclavicular and axillary areas.   Breasts are large with moderate ptosis.  Skin is normal.  Nipples are everted and appear normal.  On the right upper inner breast there is an area of increased glandular edema that is specifically tender to palpation.  There are no other areas of concern on breast exam.      Lymph:       No supraclavicular/infraclavicular adenopathy.   Axillary adenopathy: none    Assessment/Plan: Fanny Sultana is a 34 year old who presents with focal right breast pain and inconclusive imaging with " mammogram and ultrasound.  On exam she has increased granularity/density in the upper inner right breast at the site of the finding on the mammogram.  We discussed options going forward including a 6-month follow-up mammo to see if this area is changing versus breast MRI.  Given the density in the area I feel that MRI would be better suited for evaluation to ensure we are not missing an underlying lesion.  We discussed the improved sensitivity of MRI but worse specificity and we may ultimately recommend needle biopsy of this area pending MRI findings.  She is in agreement with our plan.  She does have some claustrophobia and a prescription for Valium was sent to her pharmacy.      30 minutes total time spent on the date of this encounter doing: chart review, review of test results, patient visit, physical exam, education, counseling, developing plan of care, and documenting.    Tiny James MD      Please route or send letter to:  Primary Care Provider (PCP) and Referring Provider

## 2024-09-14 ENCOUNTER — HEALTH MAINTENANCE LETTER (OUTPATIENT)
Age: 34
End: 2024-09-14

## 2024-09-17 ENCOUNTER — TELEPHONE (OUTPATIENT)
Dept: SURGERY | Facility: CLINIC | Age: 34
End: 2024-09-17
Payer: COMMERCIAL

## 2024-09-17 NOTE — CONFIDENTIAL NOTE
Letter for Breast MRI coverage    Fanny is a 34yof with focal breast pain who had mammogram for evaluation which revealed a focal area of abnormality in the right upper breast. US did not reveal any abnormality at this site. This is at the area of her pain. I recommend a breast MRI for further evaluation given her focal pain and inconclusive mammogram as MRI is much more sensitive for ruling out breast cancer than mammogram especially in patients with dense breast tissue. I do not feel waiting 6 months to determine if this is breast cancer is in the best interest of this patient. The sensitivity of mammogram in dense tissue is 60-65% compared to 90% for a breast MRI. She also has a family history of breast cancer which places her at higher risk as well.     Tiny James MD  [facility]  [phone]

## 2024-09-30 ENCOUNTER — TELEPHONE (OUTPATIENT)
Dept: SURGERY | Facility: CLINIC | Age: 34
End: 2024-09-30
Payer: COMMERCIAL

## 2024-09-30 ENCOUNTER — MYC MEDICAL ADVICE (OUTPATIENT)
Dept: SURGERY | Facility: CLINIC | Age: 34
End: 2024-09-30
Payer: COMMERCIAL

## 2024-09-30 DIAGNOSIS — R92.8 OTH ABN AND INCONCLUSIVE FINDINGS ON DX IMAGING OF BREAST: Primary | ICD-10-CM

## 2024-09-30 NOTE — CONFIDENTIAL NOTE
Orders placed for mammogram and US in November given MRI was denied despite letter of medical necessity. Pt should follow up with me after mammo/US for exam and to discuss next options.     Tiny James MD  [facility]  [phone]

## 2024-10-01 ENCOUNTER — TELEPHONE (OUTPATIENT)
Dept: SURGERY | Facility: CLINIC | Age: 34
End: 2024-10-01
Payer: COMMERCIAL

## 2024-10-01 NOTE — TELEPHONE ENCOUNTER
Called Fanny back to discuss. She has not yet read Dr. James's [employer] message from yesterday (she has to reset her password).  Reviewed message with her:    Hi Fanny,      You may of heard that the breast MRI I had ordered for you was denied. I had sent a letter of medical necessity and they still denied it as you have not had a follow up mammogram as recommended by the radiology team at 6 months.      I think our best course of action is to due the diagnostic mammogram in November (6 months from June mammo) and see how this area appears and have you follow up with me after the imaging to review and discuss next steps/options based on imaging and symptoms. I will place orders for the mammogram and will also get an US of the area too.      Let me know if any questions or concerns.      Fanny reports that she has already scheduled to mammogram and ultrasound. Does she need to call to cancel the mammogram? Informed her that once she is logged into [employer], she can cancel it that way.     Mammogram and ultrasound scheduled for November 1.  Appt to follow, same day.    Patient was encouraged to call PRN with any questions or concerns.    She agreed.    Pamela Centeno, RN-BSN

## 2024-10-01 NOTE — TELEPHONE ENCOUNTER
10/1 Called patient and left voicemail. Provided patient with [phone] (Central Scheduling Imaging) as a call back number. Patient to schedule Diagnostic Mammogram and US in November 2024. Dr. James placed orders.     Janna ball Complex   Dermatology, Surgery, Urology  [facility]

## 2024-10-01 NOTE — TELEPHONE ENCOUNTER
Fanny would like a call back to discuss the plan for imaging and biopsy. She thought she was going to have a biopsy done soon also? Is that on hold for now?     Please call to advise.     [phone]  Ok to leave VM

## 2024-10-03 NOTE — TELEPHONE ENCOUNTER
10/3 Patient scheduled.     Janna ball Complex   Dermatology, Surgery, Urology  [facility]

## 2024-10-08 ENCOUNTER — ANCILLARY PROCEDURE (OUTPATIENT)
Dept: MAMMOGRAPHY | Facility: CLINIC | Age: 34
End: 2024-10-08
Attending: SURGERY
Payer: COMMERCIAL

## 2024-10-08 DIAGNOSIS — R92.8 OTH ABN AND INCONCLUSIVE FINDINGS ON DX IMAGING OF BREAST: ICD-10-CM

## 2024-10-08 PROCEDURE — 77065 DX MAMMO INCL CAD UNI: CPT | Mod: RT

## 2024-10-08 PROCEDURE — 76642 ULTRASOUND BREAST LIMITED: CPT | Mod: RT

## 2024-10-10 ENCOUNTER — PREP FOR PROCEDURE (OUTPATIENT)
Dept: SURGERY | Facility: CLINIC | Age: 34
End: 2024-10-10
Payer: COMMERCIAL

## 2024-10-10 DIAGNOSIS — N63.12 MASS OF UPPER INNER QUADRANT OF RIGHT BREAST: Primary | ICD-10-CM

## 2024-10-10 NOTE — PROGRESS NOTES
I called Fanny and discussed her recent breast imaging which shows this area of asymmetric glandular tissue is stable.  I also discussed her imaging with Dr. Fuentes in our breast center who agrees.  She continues to have severe pain focally at this site.  We discussed her options going forward would include consideration of tag localized excisional biopsy versus symptomatic management with follow-up imaging in 6 months.  This is bothering her every day and she is interested in surgical excision.  Dr. Fuentes feels this would be easy to localize with RFID placement and I will place orders for scheduling this.    Tiny James MD  [facility]  [phone]

## 2024-10-10 NOTE — TELEPHONE ENCOUNTER
Patient has follow up with Dr. James scheduled for 11/1/24 at 12:00p.m.    I will forward this to Dr. James to review and advise if patient needs that follow up visit or what her recommendations are.  Maylin Johnson RN BSN  Breast Nurse Care Coordinator  [facility] [place]  [phone]

## 2024-10-11 ENCOUNTER — TELEPHONE (OUTPATIENT)
Dept: SURGERY | Facility: CLINIC | Age: 34
End: 2024-10-11
Payer: COMMERCIAL

## 2024-10-11 NOTE — TELEPHONE ENCOUNTER
Dr. James spoke with patient and Fanny is now scheduled for right breast excisional biopsy on 11/4/24.  11/1/24 clinic visit cancelled.  Maylin Johnson, RN, BSN

## 2024-10-11 NOTE — TELEPHONE ENCOUNTER
Type of surgery: RFID localized right breast excisional biopsy  Location of surgery: [facility]  Date and time of surgery: 11/4/24 at 2:45pm  Surgeon: Dr. Tiny James  Pre-Op Appt Date: patient to schedule  Post-Op Appt Date: patient to schedule   Packet sent out: Yes  Pre-cert/Authorization completed:  Not Applicable  Date: 10/11/24

## 2024-10-13 ENCOUNTER — MYC MEDICAL ADVICE (OUTPATIENT)
Dept: SURGERY | Facility: CLINIC | Age: 34
End: 2024-10-13
Payer: COMMERCIAL

## 2024-10-18 ENCOUNTER — TRANSFERRED RECORDS (OUTPATIENT)
Dept: HEALTH INFORMATION MANAGEMENT | Facility: CLINIC | Age: 34
End: 2024-10-18

## 2024-10-21 ENCOUNTER — TELEPHONE (OUTPATIENT)
Dept: OPHTHALMOLOGY | Facility: CLINIC | Age: 34
End: 2024-10-21
Payer: COMMERCIAL

## 2024-10-21 NOTE — TELEPHONE ENCOUNTER
Preop paperwork was faxed to our fax on accident. She does not have any eye surgeries scheduled with us so we need the fax number for that doctor and we can fax her paperwork to them. Asked her to leave the fax number to her eye surgeon and I can fax to them.

## 2024-10-28 ENCOUNTER — HOSPITAL ENCOUNTER (OUTPATIENT)
Dept: MAMMOGRAPHY | Facility: CLINIC | Age: 34
Discharge: HOME | End: 2024-10-28
Attending: SURGERY
Payer: COMMERCIAL

## 2024-10-28 DIAGNOSIS — N63.12 MASS OF UPPER INNER QUADRANT OF RIGHT BREAST: ICD-10-CM

## 2024-10-28 PROCEDURE — A4648 IMPLANTABLE TISSUE MARKER: HCPCS

## 2024-10-28 PROCEDURE — 250N000009 HC RX 250: Performed by: RADIOLOGY

## 2024-10-28 PROCEDURE — 999N000065 MA POST PROCEDURE RIGHT

## 2024-10-28 RX ADMIN — LIDOCAINE HYDROCHLORIDE 5 ML: 10 INJECTION, SOLUTION INFILTRATION; PERINEURAL at 09:08

## 2024-10-28 NOTE — DISCHARGE INSTRUCTIONS
What to Do after Localizer Placement    Your care team has placed a localizer tag in your body.    Here's what to expect and what you should do for the next few days.    Bleeding or bruising  A  little bruising is normal.    If you bleed through the bandage, put direct pressure on the site.   If you're still bleeding after 20 minutes, call the doctor who ordered the localizer.    Caring for you bandage  Keep your bandage on until tomorrow morning.    Do not get it wet.    Showering  Don't shower today.  Tomorrow, you may remove the bandage and shower.      If you have pain or discomfort  Place an ice pack on the sore area for 15 to 20 minutes, several times a day.    What to do for infection      Infection is rare.  Signs of infection include:  Fever  Redness  Pain getting worse  Fluid draining from the site.      If you have any of these symptoms, please call the doctor who ordered the localizer.      When to call the doctor   Call the doctor who ordered the localizer if:  You have bleeding that lasts more than 20 minutes.  You have pain that you can't control.  You have signs of infection (fever, redness, drainage or other signs).      Please call one of our nurses if you have questions or concerns.   [facility]   Nurse Navigator  [phone]  Procedure Nurse  [phone]

## 2024-10-31 RX ORDER — ALBUTEROL SULFATE 90 UG/1
2 INHALANT RESPIRATORY (INHALATION) EVERY 6 HOURS PRN
COMMUNITY

## 2024-10-31 RX ORDER — OXCARBAZEPINE 300 MG/1
300 TABLET, FILM COATED ORAL 2 TIMES DAILY
COMMUNITY

## 2024-10-31 RX ORDER — PREGABALIN 300 MG/1
CAPSULE ORAL
COMMUNITY

## 2024-10-31 RX ORDER — INHALER, ASSIST DEVICES
SPACER (EA) MISCELLANEOUS
COMMUNITY

## 2024-10-31 RX ORDER — DOCUSATE SODIUM 100 MG/1
100 CAPSULE, LIQUID FILLED ORAL 2 TIMES DAILY
COMMUNITY

## 2024-10-31 RX ORDER — HYDROCHLOROTHIAZIDE 12.5 MG/1
12.5 CAPSULE ORAL DAILY
COMMUNITY

## 2024-10-31 RX ORDER — ONDANSETRON 4 MG/1
4 TABLET, ORALLY DISINTEGRATING ORAL EVERY 8 HOURS PRN
COMMUNITY

## 2024-11-03 ENCOUNTER — ANESTHESIA EVENT (OUTPATIENT)
Dept: SURGERY | Facility: CLINIC | Age: 34
End: 2024-11-03
Payer: COMMERCIAL

## 2024-11-04 ENCOUNTER — APPOINTMENT (OUTPATIENT)
Dept: SURGERY | Facility: PHYSICIAN GROUP | Age: 34
End: 2024-11-04
Payer: COMMERCIAL

## 2024-11-04 ENCOUNTER — HOSPITAL ENCOUNTER (OUTPATIENT)
Dept: MAMMOGRAPHY | Facility: CLINIC | Age: 34
Discharge: HOME | End: 2024-11-04
Attending: SURGERY
Payer: COMMERCIAL

## 2024-11-04 ENCOUNTER — ANESTHESIA (OUTPATIENT)
Dept: SURGERY | Facility: CLINIC | Age: 34
End: 2024-11-04
Payer: COMMERCIAL

## 2024-11-04 ENCOUNTER — HOSPITAL ENCOUNTER (OUTPATIENT)
Facility: CLINIC | Age: 34
Discharge: HOME | End: 2024-11-04
Attending: SURGERY | Admitting: SURGERY
Payer: COMMERCIAL

## 2024-11-04 VITALS
DIASTOLIC BLOOD PRESSURE: 64 MMHG | TEMPERATURE: 97.7 F | OXYGEN SATURATION: 93 % | BODY MASS INDEX: 48.82 KG/M2 | WEIGHT: 293 LBS | HEIGHT: 65 IN | HEART RATE: 71 BPM | RESPIRATION RATE: 12 BRPM | SYSTOLIC BLOOD PRESSURE: 123 MMHG

## 2024-11-04 DIAGNOSIS — G89.18 POSTOPERATIVE PAIN: Primary | ICD-10-CM

## 2024-11-04 DIAGNOSIS — N63.12 MASS OF UPPER INNER QUADRANT OF RIGHT BREAST: ICD-10-CM

## 2024-11-04 LAB — HCG UR QL: NEGATIVE

## 2024-11-04 PROCEDURE — 19083 BX BREAST 1ST LESION US IMAG: CPT | Performed by: NURSE ANESTHETIST, CERTIFIED REGISTERED

## 2024-11-04 PROCEDURE — 19301 PARTIAL MASTECTOMY: CPT | Mod: RT | Performed by: SURGERY

## 2024-11-04 PROCEDURE — 999N000141 HC STATISTIC PRE-PROCEDURE NURSING ASSESSMENT: Performed by: SURGERY

## 2024-11-04 PROCEDURE — 250N000009 HC RX 250: Performed by: NURSE ANESTHETIST, CERTIFIED REGISTERED

## 2024-11-04 PROCEDURE — 710N000009 HC RECOVERY PHASE 1, LEVEL 1, PER MIN: Performed by: SURGERY

## 2024-11-04 PROCEDURE — 370N000017 HC ANESTHESIA TECHNICAL FEE, PER MIN: Performed by: SURGERY

## 2024-11-04 PROCEDURE — 360N000083 HC SURGERY LEVEL 3 W/ FLUORO, PER MIN: Performed by: SURGERY

## 2024-11-04 PROCEDURE — 19083 BX BREAST 1ST LESION US IMAG: CPT | Performed by: ANESTHESIOLOGY

## 2024-11-04 PROCEDURE — 250N000011 HC RX IP 250 OP 636: Performed by: NURSE ANESTHETIST, CERTIFIED REGISTERED

## 2024-11-04 PROCEDURE — 250N000013 HC RX MED GY IP 250 OP 250 PS 637: Performed by: PHYSICIAN ASSISTANT

## 2024-11-04 PROCEDURE — 258N000003 HC RX IP 258 OP 636: Performed by: NURSE ANESTHETIST, CERTIFIED REGISTERED

## 2024-11-04 PROCEDURE — 88307 TISSUE EXAM BY PATHOLOGIST: CPT | Mod: TC | Performed by: SURGERY

## 2024-11-04 PROCEDURE — 81025 URINE PREGNANCY TEST: CPT | Performed by: ANESTHESIOLOGY

## 2024-11-04 PROCEDURE — 88307 TISSUE EXAM BY PATHOLOGIST: CPT | Mod: 26 | Performed by: PATHOLOGY

## 2024-11-04 PROCEDURE — 710N000012 HC RECOVERY PHASE 2, PER MINUTE: Performed by: SURGERY

## 2024-11-04 PROCEDURE — 19301 PARTIAL MASTECTOMY: CPT | Mod: AS | Performed by: PHYSICIAN ASSISTANT

## 2024-11-04 PROCEDURE — 250N000009 HC RX 250: Performed by: SURGERY

## 2024-11-04 PROCEDURE — 250N000011 HC RX IP 250 OP 636: Mod: JZ | Performed by: SURGERY

## 2024-11-04 PROCEDURE — 250N000025 HC SEVOFLURANE, PER MIN: Performed by: SURGERY

## 2024-11-04 PROCEDURE — 999N000104 MA BREAST SPECIMEN RIGHT OR

## 2024-11-04 PROCEDURE — 250N000011 HC RX IP 250 OP 636: Performed by: ANESTHESIOLOGY

## 2024-11-04 PROCEDURE — 272N000001 HC OR GENERAL SUPPLY STERILE: Performed by: SURGERY

## 2024-11-04 RX ORDER — SODIUM CHLORIDE, SODIUM LACTATE, POTASSIUM CHLORIDE, CALCIUM CHLORIDE 600; 310; 30; 20 MG/100ML; MG/100ML; MG/100ML; MG/100ML
INJECTION, SOLUTION INTRAVENOUS CONTINUOUS PRN
Status: DISCONTINUED | OUTPATIENT
Start: 2024-11-04 | End: 2024-11-04

## 2024-11-04 RX ORDER — LIDOCAINE HYDROCHLORIDE 10 MG/ML
INJECTION, SOLUTION EPIDURAL; INFILTRATION; INTRACAUDAL; PERINEURAL
Status: DISCONTINUED
Start: 2024-11-04 | End: 2024-11-04 | Stop reason: HOSPADM

## 2024-11-04 RX ORDER — AMOXICILLIN 250 MG
1-2 CAPSULE ORAL 2 TIMES DAILY PRN
Qty: 30 TABLET | Refills: 0 | Status: SHIPPED | OUTPATIENT
Start: 2024-11-04

## 2024-11-04 RX ORDER — HYDROMORPHONE HCL IN WATER/PF 6 MG/30 ML
0.4 PATIENT CONTROLLED ANALGESIA SYRINGE INTRAVENOUS EVERY 5 MIN PRN
Status: DISCONTINUED | OUTPATIENT
Start: 2024-11-04 | End: 2024-11-04 | Stop reason: HOSPADM

## 2024-11-04 RX ORDER — NALOXONE HYDROCHLORIDE 0.4 MG/ML
0.1 INJECTION, SOLUTION INTRAMUSCULAR; INTRAVENOUS; SUBCUTANEOUS
Status: DISCONTINUED | OUTPATIENT
Start: 2024-11-04 | End: 2024-11-04 | Stop reason: HOSPADM

## 2024-11-04 RX ORDER — PROPOFOL 10 MG/ML
INJECTION, EMULSION INTRAVENOUS PRN
Status: DISCONTINUED | OUTPATIENT
Start: 2024-11-04 | End: 2024-11-04

## 2024-11-04 RX ORDER — CEFAZOLIN SODIUM 1 G/3ML
INJECTION, POWDER, FOR SOLUTION INTRAMUSCULAR; INTRAVENOUS PRN
Status: DISCONTINUED | OUTPATIENT
Start: 2024-11-04 | End: 2024-11-04

## 2024-11-04 RX ORDER — DEXAMETHASONE SODIUM PHOSPHATE 4 MG/ML
INJECTION, SOLUTION INTRA-ARTICULAR; INTRALESIONAL; INTRAMUSCULAR; INTRAVENOUS; SOFT TISSUE PRN
Status: DISCONTINUED | OUTPATIENT
Start: 2024-11-04 | End: 2024-11-04

## 2024-11-04 RX ORDER — CEFAZOLIN SODIUM/WATER 3 G/30 ML
3 SYRINGE (ML) INTRAVENOUS
Status: DISCONTINUED | OUTPATIENT
Start: 2024-11-04 | End: 2024-11-04 | Stop reason: HOSPADM

## 2024-11-04 RX ORDER — HYDROCODONE BITARTRATE AND ACETAMINOPHEN 5; 325 MG/1; MG/1
1 TABLET ORAL EVERY 4 HOURS PRN
Qty: 20 TABLET | Refills: 0 | Status: SHIPPED | OUTPATIENT
Start: 2024-11-04

## 2024-11-04 RX ORDER — ONDANSETRON 2 MG/ML
4 INJECTION INTRAMUSCULAR; INTRAVENOUS EVERY 30 MIN PRN
Status: DISCONTINUED | OUTPATIENT
Start: 2024-11-04 | End: 2024-11-04 | Stop reason: HOSPADM

## 2024-11-04 RX ORDER — LABETALOL HYDROCHLORIDE 5 MG/ML
10 INJECTION, SOLUTION INTRAVENOUS
Status: DISCONTINUED | OUTPATIENT
Start: 2024-11-04 | End: 2024-11-04 | Stop reason: HOSPADM

## 2024-11-04 RX ORDER — BUPIVACAINE HYDROCHLORIDE 2.5 MG/ML
INJECTION, SOLUTION EPIDURAL; INFILTRATION; INTRACAUDAL
Status: DISCONTINUED
Start: 2024-11-04 | End: 2024-11-04 | Stop reason: HOSPADM

## 2024-11-04 RX ORDER — ONDANSETRON 2 MG/ML
INJECTION INTRAMUSCULAR; INTRAVENOUS PRN
Status: DISCONTINUED | OUTPATIENT
Start: 2024-11-04 | End: 2024-11-04

## 2024-11-04 RX ORDER — DEXMEDETOMIDINE HYDROCHLORIDE 4 UG/ML
INJECTION, SOLUTION INTRAVENOUS PRN
Status: DISCONTINUED | OUTPATIENT
Start: 2024-11-04 | End: 2024-11-04

## 2024-11-04 RX ORDER — FENTANYL CITRATE 50 UG/ML
INJECTION, SOLUTION INTRAMUSCULAR; INTRAVENOUS PRN
Status: DISCONTINUED | OUTPATIENT
Start: 2024-11-04 | End: 2024-11-04

## 2024-11-04 RX ORDER — DEXAMETHASONE SODIUM PHOSPHATE 4 MG/ML
4 INJECTION, SOLUTION INTRA-ARTICULAR; INTRALESIONAL; INTRAMUSCULAR; INTRAVENOUS; SOFT TISSUE
Status: DISCONTINUED | OUTPATIENT
Start: 2024-11-04 | End: 2024-11-04 | Stop reason: HOSPADM

## 2024-11-04 RX ORDER — CEFAZOLIN SODIUM/WATER 2 G/20 ML
SYRINGE (ML) INTRAVENOUS PRN
Status: DISCONTINUED | OUTPATIENT
Start: 2024-11-04 | End: 2024-11-04

## 2024-11-04 RX ORDER — LIDOCAINE HYDROCHLORIDE 20 MG/ML
INJECTION, SOLUTION INFILTRATION; PERINEURAL PRN
Status: DISCONTINUED | OUTPATIENT
Start: 2024-11-04 | End: 2024-11-04

## 2024-11-04 RX ORDER — HYDROMORPHONE HCL IN WATER/PF 6 MG/30 ML
0.2 PATIENT CONTROLLED ANALGESIA SYRINGE INTRAVENOUS EVERY 5 MIN PRN
Status: DISCONTINUED | OUTPATIENT
Start: 2024-11-04 | End: 2024-11-04 | Stop reason: HOSPADM

## 2024-11-04 RX ORDER — HYDROCODONE BITARTRATE AND ACETAMINOPHEN 5; 325 MG/1; MG/1
1 TABLET ORAL
Status: COMPLETED | OUTPATIENT
Start: 2024-11-04 | End: 2024-11-04

## 2024-11-04 RX ORDER — FENTANYL CITRATE 0.05 MG/ML
50 INJECTION, SOLUTION INTRAMUSCULAR; INTRAVENOUS EVERY 5 MIN PRN
Status: DISCONTINUED | OUTPATIENT
Start: 2024-11-04 | End: 2024-11-04 | Stop reason: HOSPADM

## 2024-11-04 RX ORDER — ONDANSETRON 4 MG/1
4 TABLET, ORALLY DISINTEGRATING ORAL EVERY 30 MIN PRN
Status: DISCONTINUED | OUTPATIENT
Start: 2024-11-04 | End: 2024-11-04 | Stop reason: HOSPADM

## 2024-11-04 RX ORDER — FENTANYL CITRATE 0.05 MG/ML
25 INJECTION, SOLUTION INTRAMUSCULAR; INTRAVENOUS EVERY 5 MIN PRN
Status: DISCONTINUED | OUTPATIENT
Start: 2024-11-04 | End: 2024-11-04 | Stop reason: HOSPADM

## 2024-11-04 RX ORDER — MAGNESIUM HYDROXIDE 1200 MG/15ML
LIQUID ORAL PRN
Status: DISCONTINUED | OUTPATIENT
Start: 2024-11-04 | End: 2024-11-04 | Stop reason: HOSPADM

## 2024-11-04 RX ORDER — SODIUM CHLORIDE, SODIUM LACTATE, POTASSIUM CHLORIDE, CALCIUM CHLORIDE 600; 310; 30; 20 MG/100ML; MG/100ML; MG/100ML; MG/100ML
INJECTION, SOLUTION INTRAVENOUS CONTINUOUS
Status: DISCONTINUED | OUTPATIENT
Start: 2024-11-04 | End: 2024-11-04 | Stop reason: HOSPADM

## 2024-11-04 RX ORDER — CEFAZOLIN SODIUM/WATER 3 G/30 ML
3 SYRINGE (ML) INTRAVENOUS SEE ADMIN INSTRUCTIONS
Status: DISCONTINUED | OUTPATIENT
Start: 2024-11-04 | End: 2024-11-04 | Stop reason: HOSPADM

## 2024-11-04 RX ORDER — PROPOFOL 10 MG/ML
INJECTION, EMULSION INTRAVENOUS CONTINUOUS PRN
Status: DISCONTINUED | OUTPATIENT
Start: 2024-11-04 | End: 2024-11-04

## 2024-11-04 RX ADMIN — Medication 100 MG: at 14:34

## 2024-11-04 RX ADMIN — PHENYLEPHRINE HYDROCHLORIDE 100 MCG: 10 INJECTION INTRAVENOUS at 14:07

## 2024-11-04 RX ADMIN — SODIUM CHLORIDE, POTASSIUM CHLORIDE, SODIUM LACTATE AND CALCIUM CHLORIDE: 600; 310; 30; 20 INJECTION, SOLUTION INTRAVENOUS at 13:41

## 2024-11-04 RX ADMIN — Medication 200 MG: at 14:21

## 2024-11-04 RX ADMIN — PHENYLEPHRINE HYDROCHLORIDE 100 MCG: 10 INJECTION INTRAVENOUS at 14:21

## 2024-11-04 RX ADMIN — FENTANYL CITRATE 50 MCG: 50 INJECTION, SOLUTION INTRAMUSCULAR; INTRAVENOUS at 15:14

## 2024-11-04 RX ADMIN — Medication 2 G: at 13:46

## 2024-11-04 RX ADMIN — DEXMEDETOMIDINE HYDROCHLORIDE 20 MCG: 200 INJECTION INTRAVENOUS at 14:38

## 2024-11-04 RX ADMIN — HYDROMORPHONE HYDROCHLORIDE 0.4 MG: 0.2 INJECTION, SOLUTION INTRAMUSCULAR; INTRAVENOUS; SUBCUTANEOUS at 15:38

## 2024-11-04 RX ADMIN — PROPOFOL 200 MG: 10 INJECTION, EMULSION INTRAVENOUS at 13:46

## 2024-11-04 RX ADMIN — PHENYLEPHRINE HYDROCHLORIDE 100 MCG: 10 INJECTION INTRAVENOUS at 14:19

## 2024-11-04 RX ADMIN — ROCURONIUM BROMIDE 40 MG: 50 INJECTION, SOLUTION INTRAVENOUS at 13:46

## 2024-11-04 RX ADMIN — MIDAZOLAM 2 MG: 1 INJECTION INTRAMUSCULAR; INTRAVENOUS at 13:43

## 2024-11-04 RX ADMIN — CEFAZOLIN 1 G: 1 INJECTION, POWDER, FOR SOLUTION INTRAMUSCULAR; INTRAVENOUS at 13:46

## 2024-11-04 RX ADMIN — FENTANYL CITRATE 25 MCG: 50 INJECTION INTRAMUSCULAR; INTRAVENOUS at 14:41

## 2024-11-04 RX ADMIN — ONDANSETRON 4 MG: 2 INJECTION INTRAMUSCULAR; INTRAVENOUS at 13:56

## 2024-11-04 RX ADMIN — DEXAMETHASONE SODIUM PHOSPHATE 4 MG: 4 INJECTION, SOLUTION INTRA-ARTICULAR; INTRALESIONAL; INTRAMUSCULAR; INTRAVENOUS; SOFT TISSUE at 13:56

## 2024-11-04 RX ADMIN — FENTANYL CITRATE 50 MCG: 50 INJECTION INTRAMUSCULAR; INTRAVENOUS at 13:46

## 2024-11-04 RX ADMIN — LIDOCAINE HYDROCHLORIDE 100 MG: 20 INJECTION, SOLUTION INFILTRATION; PERINEURAL at 13:46

## 2024-11-04 RX ADMIN — FENTANYL CITRATE 50 MCG: 50 INJECTION, SOLUTION INTRAMUSCULAR; INTRAVENOUS at 15:25

## 2024-11-04 RX ADMIN — FENTANYL CITRATE 25 MCG: 50 INJECTION INTRAMUSCULAR; INTRAVENOUS at 14:45

## 2024-11-04 RX ADMIN — PHENYLEPHRINE HYDROCHLORIDE 100 MCG: 10 INJECTION INTRAVENOUS at 13:59

## 2024-11-04 RX ADMIN — HYDROCODONE BITARTRATE AND ACETAMINOPHEN 1 TABLET: 5; 325 TABLET ORAL at 15:51

## 2024-11-04 RX ADMIN — PROPOFOL 100 MCG/KG/MIN: 10 INJECTION, EMULSION INTRAVENOUS at 13:48

## 2024-11-04 ASSESSMENT — ACTIVITIES OF DAILY LIVING (ADL)
ADLS_ACUITY_SCORE: 0

## 2024-11-04 ASSESSMENT — LIFESTYLE VARIABLES: TOBACCO_USE: 1

## 2024-11-04 NOTE — ANESTHESIA POSTPROCEDURE EVALUATION
Patient: Fanny Sultana    Procedure: Procedure(s):  Right breast tag localized excisional biopsy       Anesthesia Type:  General    Note:  Disposition: Outpatient   Postop Pain Control: Uneventful            Sign Out: Well controlled pain   PONV: No   Neuro/Psych: Uneventful            Sign Out: Acceptable/Baseline neuro status   Airway/Respiratory: Uneventful            Sign Out: Acceptable/Baseline resp. status   CV/Hemodynamics: Uneventful            Sign Out: Acceptable CV status; No obvious hypovolemia; No obvious fluid overload   Other NRE: NONE   DID A NON-ROUTINE EVENT OCCUR? No           Last vitals:  Vitals Value Taken Time   /64 11/04/24 1551   Temp 36.5  C (97.7  F) 11/04/24 1545   Pulse 71 11/04/24 1551   Resp 12 11/04/24 1551   SpO2 93 % 11/04/24 1551       Electronically Signed By: Terrance Pepe DO  November 4, 2024  4:20 PM

## 2024-11-04 NOTE — ANESTHESIA PREPROCEDURE EVALUATION
Anesthesia Pre-Procedure Evaluation    Patient: Fanny Sultana   MRN: [id number] : 1990        Procedure : Procedure(s):  Right breast tag localized excisional biopsy          Past Medical History:   Diagnosis Date    Anxiety     Asthma     Bilateral hand numbness     Breast cyst     Breast pain, right     Chronic pain disorder     Dermoid     GERD (gastroesophageal reflux disease)     HTN (hypertension)     Hx of retinal detachment     Hyperlipidemia     MDD (major depressive disorder)     Migraines     Nephrolithiasis     Neuropathic pain     Panic disorder     Trochlear neuropathy, bilateral       Past Surgical History:   Procedure Laterality Date    DERMOID CYST EXCISION  2024    GUM/ORAL SURGERY(2ND)      lpsy left ovarina cystectomy  10/01/2011    dermoid    SUBTENON'S/PERIOCULAR STERIOD  OD (RIGHT EYE)      x 4    SUBTENON'S/PERIOCULAR STERIOD OS (LEFT EYE)      x 4    WISDOM TEETH EXTRACTION        Allergies   Allergen Reactions    Valacyclovir Rash     Weak, mind fog, and hard time walking    Acyclovir Hives, Itching and Rash      Social History     Tobacco Use    Smoking status: Never     Passive exposure: Yes    Smokeless tobacco: Never   Substance Use Topics    Alcohol use: No     Alcohol/week: 0.8 standard drinks of alcohol     Types: 1 Standard drinks or equivalent per week      Wt Readings from Last 1 Encounters:   09/10/24 127 kg (280 lb)        Anesthesia Evaluation            ROS/MED HX  ENT/Pulmonary:     (+)     GINNY risk factors,  hypertension, obese,        tobacco use,      asthma                  Neurologic:       Cardiovascular:     (+) Dyslipidemia hypertension- -   -  - -                                      METS/Exercise Tolerance:     Hematologic: Comments: Father has bleeding disorder      Musculoskeletal:       GI/Hepatic:     (+) GERD,                   Renal/Genitourinary:     (+)       Nephrolithiasis ,       Endo: Comment: Super morbid obesity, BMI 47    (+)            "    Obesity,       Psychiatric/Substance Use:     (+) psychiatric history anxiety and depression   Recreational drug usage: Cannabis.    Infectious Disease:       Malignancy: Comment:  area of asymmetric glandular tissue right breast      Other:            Physical Exam    Airway        Mallampati: II   TM distance: > 3 FB   Neck ROM: full   Mouth opening: > 3 cm    Respiratory Devices and Support         Dental  no notable dental history         Cardiovascular   cardiovascular exam normal          Pulmonary   pulmonary exam normal        breath sounds clear to auscultation           OUTSIDE LABS:  CBC:   Lab Results   Component Value Date    WBC 9.0 09/05/2017    WBC 5.8 08/03/2017    HGB 12.4 09/05/2017    HGB 13.0 08/03/2017    HCT 37.1 09/05/2017    HCT 38.9 08/03/2017     09/05/2017     08/03/2017     BMP:   Lab Results   Component Value Date     09/05/2017     08/03/2017    POTASSIUM 3.7 09/05/2017    POTASSIUM 3.8 08/03/2017    CHLORIDE 105 09/05/2017    CHLORIDE 109 08/03/2017    CO2 26 09/05/2017    CO2 26 08/03/2017    BUN 13 09/05/2017    BUN 10 08/03/2017    CR 0.76 09/05/2017    CR 0.69 08/03/2017    GLC 98 09/05/2017    GLC 93 08/03/2017     COAGS: No results found for: \"PTT\", \"INR\", \"FIBR\"  POC:   Lab Results   Component Value Date    HCGS Negative 08/03/2017     HEPATIC:   Lab Results   Component Value Date    ALBUMIN 4.1 08/03/2017    PROTTOTAL 8.0 08/03/2017    ALT 22 08/03/2017    AST 14 08/03/2017    ALKPHOS 103 08/03/2017    BILITOTAL 0.3 08/03/2017     OTHER:   Lab Results   Component Value Date    MEGAN 8.8 09/05/2017    LIPASE 121 08/03/2017    TSH 1.52 03/09/2015    CRP 18.0 (H) 03/09/2015    SED 9 09/05/2017       Anesthesia Plan    ASA Status:  3    NPO Status:  NPO Appropriate    Anesthesia Type: General.     - Airway: ETT   Induction: Intravenous.   Maintenance: Balanced.   Techniques and Equipment:     - Airway: Video-Laryngoscope       Consents    Anesthesia " Plan(s) and associated risks, benefits, and realistic alternatives discussed. Questions answered and patient/representative(s) expressed understanding.     - Discussed:     - Discussed with:  Patient      - Extended Intubation/Ventilatory Support Discussed: No.      - Patient is DNR/DNI Status: No     Use of blood products discussed: Yes.     - Discussed with: Patient.     - Consented: consented to blood products     Postoperative Care    Pain management: IV analgesics, Oral pain medications, Multi-modal analgesia.   PONV prophylaxis: Ondansetron (or other 5HT-3), Dexamethasone or Solumedrol, Background Propofol Infusion     Comments:               Terrance Pepe, DO    I have reviewed the pertinent notes and labs in the chart from the past 30 days and (re)examined the patient.  Any updates or changes from those notes are reflected in this note.

## 2024-11-04 NOTE — DISCHARGE INSTRUCTIONS
[facility]  Discharge Instructions: Post-Operative Breast Surgery    ACTIVITY  Take frequent short walks and increase your activity gradually.    Avoid strenuous physical activity or heavy lifting greater than 15-20 lbs. for 1-2 weeks with arm on the surgery side.  You may climb stairs.  Gentle rotation and stretching of your arms and shoulders will prevent joint stiffness.  You may drive without restrictions when you are not using any prescription pain medication and feel comfortable in a car.  You may return to work/school when you are comfortable without any prescription pain medication.    WOUND CARE  You may remove your ACE wrap/dressing and shower 48 hours after the surgery.  Pat your incisions dry and leave them open to air.  Re-apply dressing (Band-Aids or gauze/tape) as needed for drainage.  You have steri-strips (looks like white tape) or skin glue on your incisions.  You may peel off the steri-strips 2 weeks after your surgery if they have not peeled off on their own.  Do not soak your incisions in a tub or pool for 2 weeks.   Do not apply any lotions, creams, or ointments to your incisions.  A ridge under your incisions is normal and will gradually resolve.  Wear a supportive bra for 1-2 weeks, day and night.    DIET  Start with liquids, then gradually resume your regular diet as tolerated.   Drink plenty of liquids to stay hydrated.    PAIN  Expect some tenderness and discomfort at the incision site(s).  Use the prescribed pain medication at your discretion.  Expect gradual resolution of your pain over several days.  You may take ibuprofen with food (unless you have been told not to) or acetaminophen/Tylenol instead of or in addition to your prescribed pain medication.  However, if you are taking Norco do not take any additional acetaminophen/Tylenol.  Do not drink alcohol or drive while you are taking pain medications.    EXPECTATIONS  Pain medications can cause  constipation.  Limit use when possible.  Take an over the counter or prescribed stool softener/stimulant, such as Colace or Senna, 1-2 times a day with plenty of water.  You may take a mild over the counter laxative, such as Miralax or a suppository, as needed.    You may discontinue these medications once you are having regular bowel movements and/or are no longer taking your narcotic pain medication.    RETURN APPOINTMENT  Follow up with your surgeon in 2-4 weeks.  Please call the office at [phone] to schedule your appointment.      CALL OUR OFFICE -[phone] IF YOU HAVE:   Chills or fever above 101 F.  Increased redness, warmth, or drainage at your incisions.  Significant bleeding.  Pain not relieved by your pain medication or rest.  Increasing pain after the first 48 hours.  Any other concerns or questions.       Same Day Surgery Discharge Instructions for  Sedation and General Anesthesia     It's not unusual to feel dizzy, light-headed or faint for up to 24 hours after surgery or while taking pain medication.  If you have these symptoms: sit for a few minutes before standing and have someone assist you when you get up to walk or use the bathroom.    You should rest and relax for the next 24 hours. We recommend you make arrangements to have an adult stay with you for at least 24 hours after your discharge.  Avoid hazardous and strenuous activity.    DO NOT DRIVE any vehicle or operate mechanical equipment for 24 hours following the end of your surgery.  Even though you may feel normal, your reactions may be affected by the medication you have received.    Do not drink alcoholic beverages for 24 hours following surgery.     Slowly progress to your regular diet as you feel able. It's not unusual to feel nauseated and/or vomit after receiving anesthesia.  If you develop these symptoms, drink clear liquids (apple juice, ginger ale, broth, 7-up, etc. ) until you feel better.  If your nausea and vomiting  persists for 24 hours, please notify your surgeon.      All narcotic pain medications, along with inactivity and anesthesia, can cause constipation. Drinking plenty of liquids and increasing fiber intake will help.    For any questions of a medical nature, call your surgeon.    Do not make important decisions for 24 hours.    If you had general anesthesia, you may have a sore throat for a couple of days related to the breathing tube used during surgery.  You may use Cepacol lozenges to help with this discomfort.  If it worsens or if you develop a fever, contact your surgeon.     If you feel your pain is not well managed with the pain medications prescribed by your surgeon, please contact your surgeon's office to let them know so they can address your concerns.     **If you have concerns or questions about your procedure,    please contact Dr James at  [phone]**

## 2024-11-04 NOTE — ANESTHESIA CARE TRANSFER NOTE
Patient: Fanny Sultana    Procedure: Procedure(s):  Right breast tag localized excisional biopsy       Diagnosis: Mass of upper inner quadrant of right breast [N63.12]  Diagnosis Additional Information: No value filed.    Anesthesia Type:   General     Note:    Oropharynx: oropharynx clear of all foreign objects and spontaneously breathing  Level of Consciousness: awake  Oxygen Supplementation: face mask    Independent Airway: airway patency satisfactory and stable  Dentition: dentition unchanged  Vital Signs Stable: post-procedure vital signs reviewed and stable  Report to RN Given: handoff report given  Patient transferred to: PACU  Comments:     Neuromuscular blockade reversed with sugammadex, spontaneous respirations, adequate tidal volumes, oropharynx suctioned with soft flexible catheter, extubated atraumatically, extubated with suction, airway patent after extubation. Patient required precedex and fentanyl IV after extubation before transport to PACU. Oxygen via facemask at 8 liters per minute to PACU. Oxygen tubing connected to wall O2 in PACU, SpO2, NiBP, and EKG monitors and alarms on and functioning, report on patient's clinical status given to PACU RN, RN questions answered.                     Handoff Report: Identifed the Patient, Identified the Reponsible Provider, Reviewed the pertinent medical history, Discussed the surgical course, Reviewed Intra-OP anesthesia mangement and issues during anesthesia, Set expectations for post-procedure period and Allowed opportunity for questions and acknowledgement of understanding      Vitals:  Vitals Value Taken Time   /80 11/04/24 1448   Temp     Pulse 72 11/04/24 1454   Resp 22 11/04/24 1454   SpO2 96 % 11/04/24 1454   Vitals shown include unfiled device data.    Electronically Signed By: DINH Schwartz CRNA  November 4, 2024  2:56 PM

## 2024-11-04 NOTE — OP NOTE
[facility] Breast Surgery Operative Note      Pre-operative diagnosis: Right breast pain and imaging abnormality on mammography   Post-operative diagnosis: Same, pathology pending     Procedure: 1.  Right breast tag localized lumpectomy       Surgeon: Tiny James MD   Assistant(s):  Graciela Marquez PA-C  The PA s assistance was medically necessary to provide adequate exposure in the operating field, maintain hemostasis, cutting suture, clamping and ligating bleeding vessels, and visualization of anatomic structures throughout the surgical procedure.      Anesthesia: Local with MAC    Estimated blood loss:   2 cc     Specimens: ID Type Source Tests Collected by Time Destination   1 : RIGHT BREAST TAG LOCALIZED LUMPECTOMY Tissue Breast, Right SURGICAL PATHOLOGY EXAM Tiny James MD 11/4/2024  2:15 PM         INDICATION:  Please see my clinic note for details  DESCRIPTION OF PROCEDURE: The patient was placed on the table in supine position. General anesthetic was induced. Perioperative antibiotics were given.  The right breast and axilla were prepped and draped in standard sterile fashion.  We used the radiofrequency localizer tag placed in the Breast Center as well as the post-tag mammograms to localize the area of interest. Local anesthetic was injected along the marked line for the incision. We made a radially oriented incision centered at the 2 o'clock position on the upper inner breast. We created skin flaps along the anterior mammary fascial plane circumferentially using cautery. A suture was placed over the highest signal with the probe to guide circumferential dissection. We then carried the dissection down using electrocautery into the breast tissue and excised the area of interest, including the tag. The tissue had a slightly different appearance than the surrounding more fatty breast tissue, clinically consistent with possible PASH.   The localizer probe was used to guide the dissection. The area  of concerning breast tissue was removed in its entirety with some surrounding benign appearing breast tissue.  After the specimen was removed it had a high signal with the localizer probe.  Once the mass was removed, it was oriented with Nair dyes. A specimen mammogram was obtained and revealed the RFID tag. The specimen was then sent to pathology for review.  The wound was then examined for bleeding and hemostasis was achieved using electrocautery.       The lumpectomy cavity was reapproximated with several interrupted 2-0 vicryl sutures. The skin was closed with a deep dermal 3-0 vicryl and running 4-0 Monocryl subcuticular suture and steri strips.  The patient tolerated the procedure well.  Sponge and instrument counts were correct.           Tiny James MD  [facility]  [phone]

## 2024-11-04 NOTE — ANESTHESIA PROCEDURE NOTES
Airway       Patient location: [facility] - Operating Room or Procedural Area.       Procedure Start/Stop Times: 11/4/2024 1:47 PM and 11/4/2024 1:47 PM  Staff -        CRNA: Salvador Jim APRN CRNA       Performed By: CRNAIndications and Patient Condition       Indications for airway management: tamy-procedural and airway protection       Induction type:intravenous       Mask difficulty assessment: 2 - vent by mask + OA or adjuvant +/- NMBA    Final Airway Details       Final airway type: endotracheal airway       Successful airway: ETT - single and Oral  Endotracheal Airway Details        ETT size (mm): 7.0       Cuffed: yes       Successful intubation technique: video laryngoscopy       VL Blade Size: Glidescope 3       Grade View of Cords: 1       Adjucts: stylet       Position: Center       Measured from: gums/teeth       Secured at (cm): 22       Bite block used: None    Post intubation assessment        Placement verified by: capnometry, equal breath sounds and chest rise        Number of attempts at approach: 1       Number of other approaches attempted: 0       Secured with: tape       Ease of procedure: easy       Dentition: Intact and Unchanged    Medication(s) Administered   Medication Administration Time: 11/4/2024 1:47 PM    Additional Comments         Routine tamy-procedural airway protection.     Glidescope LoPro 3.    7.0 mm ID endotracheal tube.

## 2024-11-05 ENCOUNTER — MYC MEDICAL ADVICE (OUTPATIENT)
Dept: SURGERY | Facility: CLINIC | Age: 34
End: 2024-11-05
Payer: COMMERCIAL

## 2024-11-05 DIAGNOSIS — G89.18 ACUTE POST-OPERATIVE PAIN: Primary | ICD-10-CM

## 2024-11-05 RX ORDER — GABAPENTIN 300 MG/1
300 CAPSULE ORAL 3 TIMES DAILY
Qty: 30 CAPSULE | Refills: 0 | Status: SHIPPED | OUTPATIENT
Start: 2024-11-05

## 2024-11-05 RX ORDER — OXYCODONE HYDROCHLORIDE 5 MG/1
5-10 TABLET ORAL EVERY 4 HOURS
Qty: 30 TABLET | Refills: 0 | Status: SHIPPED | OUTPATIENT
Start: 2024-11-05

## 2024-11-05 NOTE — TELEPHONE ENCOUNTER
Patient had a right breast tag localized excisional biopsy on 11/4/24 with Dr. James for painful asymmetric glandular tissue which is painful.

## 2024-11-05 NOTE — TELEPHONE ENCOUNTER
I called Fanny and we discussed Dr. James's response below:    It sounds like more nerve pain. We could try gabapentin and change to try oxycodone instead of norco. It should improve as she continues to heal. We should have her see me next week for close follow up as well. Encourage supportive bra 24/7.     She is still wearing her ACE wrap and has a sports bra on top of that. Fanny is fine with trying Oxycodone (and stopping the Norco). We did discuss that as she is stopping the Norco, she can take Tylenol with the Oxycodone (as it doesn't have tylenol in it, like Norco does).     She is open to trying Gabapentin as well, but is wondering if this would be recommended as she is taking Lyrica. I discussed that her pharmacist should be reviewing her medications to make sure it is okay to take. But I told her when she picks up the Gabapentin, to ask the pharmacist this question. If the pharmacist says no, then she knows she shouldn't take it.     She is scheduled for a post op appointment next Friday (11/15/24) @ 2 pm.     Olya Foster, RN, BSN, PHN  Breast Care Nurse Coordinator  [facility] [place] [place]

## 2024-11-07 LAB
PATH REPORT.COMMENTS IMP SPEC: NORMAL
PATH REPORT.COMMENTS IMP SPEC: NORMAL
PATH REPORT.FINAL DX SPEC: NORMAL
PATH REPORT.GROSS SPEC: NORMAL
PATH REPORT.MICROSCOPIC SPEC OTHER STN: NORMAL
PATH REPORT.RELEVANT HX SPEC: NORMAL
PHOTO IMAGE: NORMAL

## 2024-11-15 ENCOUNTER — OFFICE VISIT (OUTPATIENT)
Dept: SURGERY | Facility: CLINIC | Age: 34
End: 2024-11-15
Payer: COMMERCIAL

## 2024-11-15 DIAGNOSIS — Z12.31 VISIT FOR SCREENING MAMMOGRAM: Primary | ICD-10-CM

## 2024-11-15 NOTE — PROGRESS NOTES
[facility] Breast Surgery Postoperative Note    S: Fanny is doing much better. Pain has improved significantly since surgery.     Breasts: Right breast incision on the upper inner breast is healing well.  No erythema or induration.    Pathology: Reviewed and copy given to patient    A/P  Fanny Sultana is recovering from a excision of right breast mass on 11/4/2024.  Her pathology revealed fibrocystic change with proliferative type microcalcifications and no evidence of malignancy.  Recall her imaging prior to surgery had revealed asymmetric glandular tissue in the right upper breast and patient was having significant pain at that site and therefore elected to proceed with excisional biopsy. She has had prior genetic testing and was negative. Her lifetime risk of breast cancer was predicted to be around 20%.   I would recommend that she proceed with annual screening mammography going forward. We did discuss high risk screening option and she would prefer annual mammography and her tissue does mammogram very well and I think this is a great option. I do not think she needs a 6 month follow up given we have excised this area.     Thank you for the opportunity to help in her care.    Tiny James MD  [facility]  [phone]    Please route or send letter to:  Primary Care Provider (PCP) and Referring Provider

## 2024-11-15 NOTE — LETTER
November 15, 2024          Barby Wang MD  [address] [place] [address]      RE:   Fanny Sultana 1990      Dear Colleague,    Thank you for referring your patient, Fanny Sultana, to [facility] [place]. Please see a copy of my visit note below.    Fanny is doing much better. Pain has improved significantly since surgery.      Breasts: Right breast incision on the upper inner breast is healing well.  No erythema or induration.     Pathology: Reviewed and copy given to patient     A/P  Fanny Sultana is recovering from a excision of right breast mass on 11/4/2024.  Her pathology revealed fibrocystic change with proliferative type microcalcifications and no evidence of malignancy.  Recall her imaging prior to surgery had revealed asymmetric glandular tissue in the right upper breast and patient was having significant pain at that site and therefore elected to proceed with excisional biopsy. She has had prior genetic testing and was negative. Her lifetime risk of breast cancer was predicted to be around 20%.   I would recommend that she proceed with annual screening mammography going forward. We did discuss high risk screening option and she would prefer annual mammography and her tissue does mammogram very well and I think this is a great option. I do not think she needs a 6 month follow up given we have excised this area.     Again, thank you for allowing me to participate in the care of your patient.      Sincerely,      Tiny James MD

## (undated) DEVICE — GLOVE BIOGEL PI MICRO INDICATOR UNDERGLOVE SZ 6.5 48965

## (undated) DEVICE — SOL WATER IRRIG 1000ML BOTTLE 2F7114

## (undated) DEVICE — GOWN IMPERVIOUS BREATHABLE SMART LG 89015

## (undated) DEVICE — SU SILK 2-0 FSL 18" 677G

## (undated) DEVICE — SU VICRYL 3-0 SH 27" J316H

## (undated) DEVICE — PACK MINOR SBA15MIFSE

## (undated) DEVICE — MARKER MARGIN MARKER STD 6 COLOR SGL USE MMS6

## (undated) DEVICE — SU MONOCRYL 4-0 PS-2 18" UND Y496G

## (undated) DEVICE — DRAPE BREAST/CHEST 29420

## (undated) DEVICE — SU VICRYL 2-0 SH 27" J317H

## (undated) DEVICE — ESU PENCIL W/SMOKE EVAC NEPTUNE STRYKER 0703-046-000

## (undated) DEVICE — ESU GROUND PAD UNIVERSAL W/O CORD

## (undated) DEVICE — SET BREAST BIOPSY LOCALIZER 20 PROBE 8MM PENCIL 09-0006

## (undated) DEVICE — GLOVE BIOGEL PI MICRO SZ 6.0 48560

## (undated) DEVICE — DECANTER BAG 2002S

## (undated) DEVICE — DRSG STERI STRIP 1/2X4" R1547

## (undated) DEVICE — BNDG ELASTIC 6" DBL LENGTH UNSTERILE 6611-16

## (undated) DEVICE — PREP CHLORAPREP 26ML TINTED HI-LITE ORANGE 930815

## (undated) DEVICE — PAD CHUX UNDERPAD 23X24" 7136

## (undated) DEVICE — SYR BULB IRRIG DOVER 60 ML LATEX FREE 67000

## (undated) DEVICE — MANIFOLD NEPTUNE 4 PORT 700-20

## (undated) DEVICE — LINEN TOWEL PACK X5 5464

## (undated) DEVICE — DECANTER VIAL 2006S

## (undated) DEVICE — NDL 19GA 1.5"

## (undated) RX ORDER — HYDROMORPHONE HCL IN WATER/PF 6 MG/30 ML
PATIENT CONTROLLED ANALGESIA SYRINGE INTRAVENOUS
Status: DISPENSED
Start: 2024-11-04

## (undated) RX ORDER — FENTANYL CITRATE 50 UG/ML
INJECTION, SOLUTION INTRAMUSCULAR; INTRAVENOUS
Status: DISPENSED
Start: 2024-11-04

## (undated) RX ORDER — FENTANYL CITRATE 0.05 MG/ML
INJECTION, SOLUTION INTRAMUSCULAR; INTRAVENOUS
Status: DISPENSED
Start: 2024-11-04

## (undated) RX ORDER — HYDROCODONE BITARTRATE AND ACETAMINOPHEN 5; 325 MG/1; MG/1
TABLET ORAL
Status: DISPENSED
Start: 2024-11-04

## (undated) RX ORDER — PROPOFOL 10 MG/ML
INJECTION, EMULSION INTRAVENOUS
Status: DISPENSED
Start: 2024-11-04